# Patient Record
Sex: MALE | Race: WHITE | Employment: OTHER | URBAN - METROPOLITAN AREA
[De-identification: names, ages, dates, MRNs, and addresses within clinical notes are randomized per-mention and may not be internally consistent; named-entity substitution may affect disease eponyms.]

---

## 2018-05-04 PROBLEM — N52.9 ERECTILE DYSFUNCTION: Status: ACTIVE | Noted: 2018-05-04

## 2019-04-06 DIAGNOSIS — N52.9 ERECTILE DYSFUNCTION, UNSPECIFIED ERECTILE DYSFUNCTION TYPE: ICD-10-CM

## 2019-04-09 RX ORDER — SILDENAFIL CITRATE 100 MG
TABLET ORAL
Qty: 18 TABLET | Refills: 2 | Status: SHIPPED | OUTPATIENT
Start: 2019-04-09 | End: 2022-04-21

## 2020-05-11 ENCOUNTER — OFFICE VISIT (OUTPATIENT)
Dept: PRIMARY CARE CLINIC | Age: 50
End: 2020-05-11
Payer: OTHER GOVERNMENT

## 2020-05-11 VITALS
WEIGHT: 186 LBS | SYSTOLIC BLOOD PRESSURE: 122 MMHG | BODY MASS INDEX: 26.88 KG/M2 | OXYGEN SATURATION: 96 % | DIASTOLIC BLOOD PRESSURE: 86 MMHG | TEMPERATURE: 97.7 F | HEART RATE: 103 BPM

## 2020-05-11 PROCEDURE — 99214 OFFICE O/P EST MOD 30 MIN: CPT | Performed by: NURSE PRACTITIONER

## 2020-05-11 RX ORDER — GABAPENTIN 100 MG/1
100 CAPSULE ORAL 3 TIMES DAILY PRN
Qty: 21 CAPSULE | Refills: 0 | Status: SHIPPED | OUTPATIENT
Start: 2020-05-11 | End: 2020-05-21 | Stop reason: SDUPTHER

## 2020-05-11 RX ORDER — TRAMADOL HYDROCHLORIDE 50 MG/1
50 TABLET ORAL EVERY 6 HOURS PRN
COMMUNITY
Start: 2020-05-10 | End: 2021-10-07

## 2020-05-11 ASSESSMENT — ENCOUNTER SYMPTOMS
SHORTNESS OF BREATH: 0
BACK PAIN: 0
WHEEZING: 0
COUGH: 0

## 2020-05-11 ASSESSMENT — PATIENT HEALTH QUESTIONNAIRE - PHQ9
SUM OF ALL RESPONSES TO PHQ9 QUESTIONS 1 & 2: 0
2. FEELING DOWN, DEPRESSED OR HOPELESS: 0
1. LITTLE INTEREST OR PLEASURE IN DOING THINGS: 0
SUM OF ALL RESPONSES TO PHQ QUESTIONS 1-9: 0
SUM OF ALL RESPONSES TO PHQ QUESTIONS 1-9: 0

## 2020-05-11 NOTE — PATIENT INSTRUCTIONS
help?  Call 911 anytime you think you may need emergency care. For example, call if:    · You have sudden chest pain and shortness of breath, or you cough up blood.     · Your leg is cool or pale or changes color.    Call your doctor now or seek immediate medical care if:    · You have increasing or severe pain.     · Your leg suddenly feels weak and you cannot move it.     · You have signs of a blood clot, such as:  ? Pain in your calf, back of the knee, thigh, or groin. ? Redness and swelling in your leg or groin.     · You have signs of infection, such as:  ? Increased pain, swelling, warmth, or redness. ? Red streaks leading from the sore area. ? Pus draining from a place on your leg. ? A fever.     · You cannot bear weight on your leg.    Watch closely for changes in your health, and be sure to contact your doctor if:    · You do not get better as expected. Where can you learn more? Go to https://infoBizz.[email or web address] org and sign in to your [employer] account. Enter T639 in the Project Repat box to learn more about \"Leg Pain: Care Instructions. \"     If you do not have an account, please click on the \"Sign Up Now\" link. Current as of: June 26, 2019Content Version: 12.4  © 5361-3845 Healthwise, Incorporated. Care instructions adapted under license by [facility] ([facility]). If you have questions about a medical condition or this instruction, always ask your healthcare professional. [place] [address] any warranty or liability for your use of this information.

## 2020-05-11 NOTE — PROGRESS NOTES
122/86   05/04/18 122/86          (goal 120/80)    Past Medical History:   Diagnosis Date    Erectile dysfunction 5/4/2018      Past Surgical History:   Procedure Laterality Date    WISDOM TOOTH EXTRACTION Bilateral        Family History   Problem Relation Age of Onset    Hypertension Father     High Cholesterol Father     Liver Cancer Maternal Grandmother     Cancer Paternal Grandmother        Social History     Tobacco Use    Smoking status: Never Smoker    Smokeless tobacco: Never Used   Substance Use Topics    Alcohol use: Yes     Comment: social      Current Outpatient Medications   Medication Sig Dispense Refill    traMADol (ULTRAM) 50 MG tablet Take 50 mg by mouth every 6 hours as needed.  gabapentin (NEURONTIN) 100 MG capsule Take 1 capsule by mouth 3 times daily as needed (nerve pain) for up to 7 days. 21 capsule 0    VIAGRA 100 MG tablet TAKE 1 TABLET AS NEEDED FOR ERECTILE DYSFUNCTION 18 tablet 2     No current facility-administered medications for this visit. No Known Allergies    Health Maintenance   Topic Date Due    HIV screen  11/25/1984    DTaP/Tdap/Td vaccine (1 - Tdap) 11/25/1988    Lipid screen  11/25/2009    Shingles Vaccine (1 of 2) 11/25/2019    Colon cancer screen colonoscopy  11/25/2019    Flu vaccine (Season Ended) 09/01/2020    Hepatitis A vaccine  Aged Out    Hepatitis B vaccine  Aged Out    Hib vaccine  Aged Out    Meningococcal (ACWY) vaccine  Aged Out    Pneumococcal 0-64 years Vaccine  Aged Out       Subjective:      Review of Systems   Constitutional: Negative for chills and fever. Respiratory: Negative for cough, shortness of breath and wheezing. Cardiovascular: Negative for chest pain and palpitations. Musculoskeletal: Positive for gait problem (d/t pain in right leg). Negative for back pain, myalgias, neck pain and neck stiffness.         Lateral right knee pain into lateral portion of RLE   Neurological: Negative for dizziness, light-headedness and headaches. Objective:     /86   Pulse 103   Temp 97.7 °F (36.5 °C)   Wt 186 lb (84.4 kg)   SpO2 96%   BMI 26.88 kg/m²   Physical Exam  Vitals signs and nursing note reviewed. Constitutional:       Appearance: Normal appearance. HENT:      Head: Normocephalic and atraumatic. Neck:      Musculoskeletal: Normal range of motion and neck supple. Cardiovascular:      Rate and Rhythm: Normal rate and regular rhythm. Pulses: Normal pulses. Heart sounds: Normal heart sounds. Pulmonary:      Effort: Pulmonary effort is normal.      Breath sounds: Normal breath sounds. Musculoskeletal:      Right hip: Normal.      Right knee: He exhibits normal range of motion, no swelling, no effusion, no ecchymosis, no deformity, no laceration, no erythema, normal alignment and no bony tenderness. Tenderness found. Lateral joint line tenderness noted. Right ankle: Normal.      Lumbar back: Normal.      Right foot: Normal.   Skin:     General: Skin is warm and dry. Capillary Refill: Capillary refill takes less than 2 seconds. Neurological:      General: No focal deficit present. Mental Status: He is alert and oriented to person, place, and time. Mental status is at baseline. Psychiatric:         Mood and Affect: Mood normal.         Behavior: Behavior normal.         Thought Content: Thought content normal.         Judgment: Judgment normal.       Assessment:       Diagnosis Orders   1. Neuralgia of right perineal nerve  MRI KNEE RIGHT WO CONTRAST    gabapentin (NEURONTIN) 100 MG capsule   2. Pain in lateral portion of right knee  MRI KNEE RIGHT WO CONTRAST   3. Right leg pain  MRI KNEE RIGHT WO CONTRAST    gabapentin (NEURONTIN) 100 MG capsule   4. Numbness of toes  MRI KNEE RIGHT WO CONTRAST        Plan:    1. Replace tramadol with gabapentin. 2. Stay off of right leg as much as possible. Elevate, rest, ice, and compress if it doesn't cause worsening of pain.

## 2020-05-21 RX ORDER — GABAPENTIN 100 MG/1
100 CAPSULE ORAL 3 TIMES DAILY PRN
Qty: 21 CAPSULE | Refills: 0 | Status: SHIPPED | OUTPATIENT
Start: 2020-05-21 | End: 2020-05-22 | Stop reason: SDUPTHER

## 2020-05-22 RX ORDER — GABAPENTIN 100 MG/1
100 CAPSULE ORAL 3 TIMES DAILY PRN
Qty: 21 CAPSULE | Refills: 0 | Status: SHIPPED | OUTPATIENT
Start: 2020-05-22 | End: 2021-10-07

## 2020-06-08 ENCOUNTER — OFFICE VISIT (OUTPATIENT)
Dept: ORTHOPEDIC SURGERY | Age: 50
End: 2020-06-08
Payer: OTHER GOVERNMENT

## 2020-06-08 PROCEDURE — 99203 OFFICE O/P NEW LOW 30 MIN: CPT | Performed by: PHYSICIAN ASSISTANT

## 2020-06-08 RX ORDER — GABAPENTIN 100 MG/1
100 CAPSULE ORAL 3 TIMES DAILY
Qty: 60 CAPSULE | Refills: 0 | Status: SHIPPED | OUTPATIENT
Start: 2020-06-08 | End: 2021-10-07

## 2020-06-08 RX ORDER — DICLOFENAC SODIUM 75 MG/1
75 TABLET, DELAYED RELEASE ORAL 2 TIMES DAILY WITH MEALS
Qty: 28 TABLET | Refills: 0 | Status: SHIPPED | OUTPATIENT
Start: 2020-06-08 | End: 2021-10-07

## 2020-06-08 NOTE — PROGRESS NOTES
Orthopedic Knee Encounter Note     Chief complaint: Right knee/leg pain    HPI: Corie Angel is a 47 y.o. male who presents for for evaluation of right knee and leg pain. Patient currently complaining of lateral right knee pain as well as posterior lateral right lower leg pain. Pain is aggravated by walking, extended weightbearing and relieved by rest.      Patient states pain started approximately 1 month ago which he thought it was his typical sciatica however usually his sciatica does not go into the knee or distally. He states he was seen by his PCP and treated for sciatica and is typical sciatic symptoms resolved i.e. low back pain and posterior thigh pain resolved. However he continued to have this right knee and lower leg pain. His PCP ordered an MRI for further evaluation of the right knee. This MRI showed a very subtle medial meniscus tear that was nondisplaced as well as a low-grade strain to the soleus muscle. No evidence of lateral meniscus tear and cruciate and collateral ligaments were intact. Patient was referred for further evaluation of the medial meniscus tear. He denies any known injury or trauma to this knee. He states he does have intermittent swelling however he denies any redness, warmth, fever or chills. He was started on gabapentin as well as ibuprofen. He states he does believe that the gabapentin provides moderate relief of his pain however he just finished the prescription last week. Previous treatment:    NSAIDs: Ibuprofen    Injections:  None    Physical therapy: None    Surgeries: None    Review of Systems:     Constitution: no fever or chills   Pain level: 5/10  Musculoskeletal: As noted in the HPI   Neurologic: no neurologic symptoms    Past Medical History  Aniya Riggins  has a past medical history of Erectile dysfunction. Past Surgical History  Aniya Riggins  has a past surgical history that includes [place] tooth extraction (Bilateral).     Current Medications  Current Outpatient

## 2021-10-07 ENCOUNTER — OFFICE VISIT (OUTPATIENT)
Dept: PRIMARY CARE CLINIC | Age: 51
End: 2021-10-07
Payer: OTHER GOVERNMENT

## 2021-10-07 VITALS
OXYGEN SATURATION: 98 % | SYSTOLIC BLOOD PRESSURE: 122 MMHG | WEIGHT: 197.8 LBS | HEART RATE: 79 BPM | DIASTOLIC BLOOD PRESSURE: 74 MMHG | BODY MASS INDEX: 28.59 KG/M2

## 2021-10-07 DIAGNOSIS — S53.401A ELBOW SPRAIN, RIGHT, INITIAL ENCOUNTER: Primary | ICD-10-CM

## 2021-10-07 DIAGNOSIS — Z12.11 ENCOUNTER FOR SCREENING COLONOSCOPY: ICD-10-CM

## 2021-10-07 DIAGNOSIS — L02.212 ABSCESS OF LOWER BACK: ICD-10-CM

## 2021-10-07 PROCEDURE — 99213 OFFICE O/P EST LOW 20 MIN: CPT | Performed by: NURSE PRACTITIONER

## 2021-10-07 RX ORDER — PREDNISONE 20 MG/1
TABLET ORAL
Qty: 18 TABLET | Refills: 0 | Status: SHIPPED | OUTPATIENT
Start: 2021-10-07 | End: 2022-04-21 | Stop reason: SDUPTHER

## 2021-10-07 RX ORDER — SULFAMETHOXAZOLE AND TRIMETHOPRIM 800; 160 MG/1; MG/1
1 TABLET ORAL 2 TIMES DAILY
Qty: 20 TABLET | Refills: 0 | Status: SHIPPED | OUTPATIENT
Start: 2021-10-07 | End: 2021-10-17

## 2021-10-07 SDOH — ECONOMIC STABILITY: FOOD INSECURITY: WITHIN THE PAST 12 MONTHS, THE FOOD YOU BOUGHT JUST DIDN'T LAST AND YOU DIDN'T HAVE MONEY TO GET MORE.: NEVER TRUE

## 2021-10-07 SDOH — ECONOMIC STABILITY: FOOD INSECURITY: WITHIN THE PAST 12 MONTHS, YOU WORRIED THAT YOUR FOOD WOULD RUN OUT BEFORE YOU GOT MONEY TO BUY MORE.: NEVER TRUE

## 2021-10-07 ASSESSMENT — PATIENT HEALTH QUESTIONNAIRE - PHQ9
SUM OF ALL RESPONSES TO PHQ QUESTIONS 1-9: 0
SUM OF ALL RESPONSES TO PHQ9 QUESTIONS 1 & 2: 0
1. LITTLE INTEREST OR PLEASURE IN DOING THINGS: 0
2. FEELING DOWN, DEPRESSED OR HOPELESS: 0

## 2021-10-07 ASSESSMENT — ENCOUNTER SYMPTOMS
SORE THROAT: 0
EYE REDNESS: 0
NAUSEA: 0
COLOR CHANGE: 1
WHEEZING: 0
DIARRHEA: 0
ABDOMINAL PAIN: 0
SHORTNESS OF BREATH: 0
VOMITING: 0
COUGH: 0
RHINORRHEA: 0
BACK PAIN: 0
EYE DISCHARGE: 0

## 2021-10-07 ASSESSMENT — SOCIAL DETERMINANTS OF HEALTH (SDOH): HOW HARD IS IT FOR YOU TO PAY FOR THE VERY BASICS LIKE FOOD, HOUSING, MEDICAL CARE, AND HEATING?: NOT HARD AT ALL

## 2021-10-07 NOTE — PROGRESS NOTES
[address] [facility]  [address]  [facility] [address]  Dept: [address] [place] [address] Jason Grayson is a 45 y.o. male Established patient, who presents today for his medical conditions/complaints as noted below. Chief Complaint   Patient presents with    Back Pain     cyst mid-left back - started one month ago -     Other     pain in right elbow - started one month ago -        HPI:     HPI  Cyst on back - felt like a bump no it is red and has some pus out it. Drained some pus and blood. It was tender but it has been worse before it drained. Pain in right elbow. Painful to lift glass. Sometimes painful when sleeping. It is constant with times more severe. Lifging agrevatees and position. No numbness or tingling  He takes ibuprofen sometimes which helps a little. No repattivve motion  It feels like when he hurt his arm pitching as a kid  No recent trauma or injury.     Reviewed prior notes Orthopedics  Reviewed previous   and Hospital Records    No results found for: LDLCHOLESTEROL, LDLCALC    (goal LDL is <100)   No results found for: AST, ALT, BUN, LABA1C, TSH  BP Readings from Last 3 Encounters:   10/07/21 122/74   05/11/20 122/86   05/04/18 122/86          (goal 120/80)    Past Medical History:   Diagnosis Date    Erectile dysfunction 5/4/2018      Past Surgical History:   Procedure Laterality Date    WISDOM TOOTH EXTRACTION Bilateral        Family History   Problem Relation Age of Onset    Hypertension Father     High Cholesterol Father     Liver Cancer Maternal Grandmother     Cancer Paternal Grandmother        Social History     Tobacco Use    Smoking status: Never Smoker    Smokeless tobacco: Never Used   Substance Use Topics    Alcohol use: Yes     Comment: social      Current Outpatient Medications   Medication Sig Dispense Refill    predniSONE (DELTASONE) 20 MG tablet 3 tabs x 3 days, then 2 tabs x 3 days, then 1 tab x 3 days 18 tablet 0    sulfamethoxazole-trimethoprim (BACTRIM DS) 800-160 MG per tablet Take 1 tablet by mouth 2 times daily for 10 days 20 tablet 0    VIAGRA 100 MG tablet TAKE 1 TABLET AS NEEDED FOR ERECTILE DYSFUNCTION 18 tablet 2     No current facility-administered medications for this visit. No Known Allergies    Health Maintenance   Topic Date Due    Hepatitis C screen  Never done    HIV screen  Never done    DTaP/Tdap/Td vaccine (1 - Tdap) Never done    Lipid screen  Never done    Colon cancer screen colonoscopy  Never done    Shingles Vaccine (1 of 2) Never done    Flu vaccine (1) Never done    COVID-19 Vaccine  Completed    Hepatitis A vaccine  Aged Out    Hepatitis B vaccine  Aged Out    Hib vaccine  Aged Out    Meningococcal (ACWY) vaccine  Aged Out    Pneumococcal 0-64 years Vaccine  Aged Out       Subjective:      Review of Systems   Constitutional: Negative for chills and fever. HENT: Negative for rhinorrhea and sore throat. Eyes: Negative for discharge and redness. Respiratory: Negative for cough, shortness of breath and wheezing. Cardiovascular: Negative for chest pain and palpitations. Gastrointestinal: Negative for abdominal pain, diarrhea, nausea and vomiting. Genitourinary: Negative for dysuria and frequency. Musculoskeletal: Negative for arthralgias, back pain and myalgias. Right elbow pain   Skin: Positive for color change. Negative for rash and wound. Neurological: Negative for dizziness, light-headedness and headaches. Psychiatric/Behavioral: Negative for dysphoric mood and sleep disturbance. The patient is not nervous/anxious. Objective:     /74   Pulse 79   Wt 197 lb 12.8 oz (89.7 kg)   SpO2 98%   BMI 28.59 kg/m²   Physical Exam  Vitals and nursing note reviewed. Constitutional:       Appearance: He is obese. HENT:      Head: Normocephalic and atraumatic.       Right Ear: Tympanic membrane, ear canal and external ear normal.      Left Ear: Tympanic membrane, ear canal and external ear normal.   Eyes:      Conjunctiva/sclera: Conjunctivae normal.      Pupils: Pupils are equal, round, and reactive to light. Cardiovascular:      Rate and Rhythm: Regular rhythm. Pulmonary:      Effort: Pulmonary effort is normal.      Breath sounds: Normal breath sounds. Abdominal:      General: Bowel sounds are normal.      Palpations: Abdomen is soft. Musculoskeletal:      Right upper arm: Tenderness present. No swelling, edema or bony tenderness. Left upper arm: Normal.        Arms:       Cervical back: Normal range of motion and neck supple. Comments: Right ligament tenderness on palpation. Pain with right elbow movement   Skin:     General: Skin is warm. Comments: 3 cm abscess lower back with erythema and tenderness and no drainage at this time   Neurological:      Mental Status: He is alert and oriented to person, place, and time. Cranial Nerves: No cranial nerve deficit. Psychiatric:         Mood and Affect: Mood normal.         Thought Content: Thought content normal.         Judgment: Judgment normal.         Assessment/Plan:   1. Elbow sprain, right, initial encounter  -     predniSONE (DELTASONE) 20 MG tablet; 3 tabs x 3 days, then 2 tabs x 3 days, then 1 tab x 3 days, Disp-18 tablet, R-0Normal  2. Abscess of lower back  -     sulfamethoxazole-trimethoprim (BACTRIM DS) 800-160 MG per tablet; Take 1 tablet by mouth 2 times daily for 10 days, Disp-20 tablet, R-0Normal     Warm moist compress to back abscess 3x/day. Apply a compressive ACE bandage. Rest and elevate the affected painful area. Apply cold compresses intermittently as needed. As pain recedes, begin normal activities slowly as tolerated. Call if symptoms persist.    Antibiotic - Bactrim DS x 10 days  Prednisone taper dose for 9 days. Return if symptoms worsen or fail to improve. No orders of the defined types were placed in this encounter.     Orders Placed This

## 2021-10-08 ENCOUNTER — TELEPHONE (OUTPATIENT)
Dept: GASTROENTEROLOGY | Age: 51
End: 2021-10-08

## 2021-10-08 NOTE — TELEPHONE ENCOUNTER
LVMx1 to schedule off of Referral. Pt has never been seen by a GI doctor in our practice. Office will need to go over screening questions prior to scheduling. Mailing letter as 2nd attempt.

## 2021-10-13 RX ORDER — BISACODYL 5 MG
TABLET, DELAYED RELEASE (ENTERIC COATED) ORAL
Qty: 4 TABLET | Refills: 0 | Status: SHIPPED | OUTPATIENT
Start: 2021-10-13 | End: 2022-04-21

## 2021-10-13 RX ORDER — POLYETHYLENE GLYCOL 3350 17 G/17G
POWDER, FOR SOLUTION ORAL
Qty: 238 G | Refills: 0 | Status: SHIPPED | OUTPATIENT
Start: 2021-10-13 | End: 2022-04-21

## 2021-10-14 NOTE — TELEPHONE ENCOUNTER
Patient LVM asking to cancel his procedure. Procedure was cancelled, writer attempted to contact patient back but phone rings busy.

## 2022-04-21 ENCOUNTER — OFFICE VISIT (OUTPATIENT)
Dept: PRIMARY CARE CLINIC | Age: 52
End: 2022-04-21
Payer: OTHER GOVERNMENT

## 2022-04-21 VITALS
HEART RATE: 80 BPM | WEIGHT: 204.2 LBS | BODY MASS INDEX: 29.51 KG/M2 | OXYGEN SATURATION: 98 % | DIASTOLIC BLOOD PRESSURE: 84 MMHG | SYSTOLIC BLOOD PRESSURE: 130 MMHG

## 2022-04-21 DIAGNOSIS — M25.551 CHRONIC RIGHT HIP PAIN: Primary | ICD-10-CM

## 2022-04-21 DIAGNOSIS — G89.29 CHRONIC RIGHT HIP PAIN: Primary | ICD-10-CM

## 2022-04-21 DIAGNOSIS — R20.0 RIGHT LEG NUMBNESS: ICD-10-CM

## 2022-04-21 PROCEDURE — 99214 OFFICE O/P EST MOD 30 MIN: CPT | Performed by: NURSE PRACTITIONER

## 2022-04-21 RX ORDER — GABAPENTIN 100 MG/1
100 CAPSULE ORAL 3 TIMES DAILY
Qty: 60 CAPSULE | Refills: 0 | Status: SHIPPED | OUTPATIENT
Start: 2022-04-21 | End: 2022-07-28

## 2022-04-21 RX ORDER — PREDNISONE 20 MG/1
TABLET ORAL
Qty: 18 TABLET | Refills: 0 | Status: SHIPPED | OUTPATIENT
Start: 2022-04-21 | End: 2022-05-01

## 2022-04-21 ASSESSMENT — ENCOUNTER SYMPTOMS: BACK PAIN: 0

## 2022-04-21 NOTE — PROGRESS NOTES
[address] [facility]  [address] [facility] [address]  Dept: 8[address] Jake Grayson is a 45 y.o. male Established patient, who presents today for his medical conditions/complaints as noted below. Chief Complaint   Patient presents with    Hip Pain     ongoing X 1 year  pt did see PT - does excersises they recommended - takes IBU OTC little relief. pt rates pain 1/10 currently & unable to walk when it is at its worst.       HPI:     Hip Pain   Incident onset: no incident - it started hurting about a year ago. There was no injury mechanism. The pain is present in the right hip. The pain is at a severity of 1/10. Pain severity now: can be severe at times. The pain has been fluctuating since onset. Associated symptoms include numbness (4th and 5th toe on right foot have been numb for a year since knee pain, right lateral knee also numb) and tingling. Pertinent negatives include no inability to bear weight, loss of motion, loss of sensation or muscle weakness. Exacerbated by: walking. He has tried NSAIDs and rest for the symptoms. The treatment provided mild relief.        Reviewed prior notes    Reviewed previous      No results found for: LDLCHOLESTEROL, LDLCALC    (goal LDL is <100)   No results found for: AST, ALT, BUN, LABA1C, TSH  BP Readings from Last 3 Encounters:   04/21/22 130/84   10/07/21 122/74   05/11/20 122/86          (goal 120/80)    Past Medical History:   Diagnosis Date    Erectile dysfunction 5/4/2018      Past Surgical History:   Procedure Laterality Date    WISDOM TOOTH EXTRACTION Bilateral        Family History   Problem Relation Age of Onset    Hypertension Father     High Cholesterol Father     Liver Cancer Maternal Grandmother     Cancer Paternal Grandmother        Social History     Tobacco Use    Smoking status: Never Smoker    Smokeless tobacco: Never Used   Substance Use Topics    Alcohol use: Yes     Comment: social Current Outpatient Medications   Medication Sig Dispense Refill    predniSONE (DELTASONE) 20 MG tablet 3 tabs x 3 days, then 2 tabs x 3 days, then 1 tab x 3 days 18 tablet 0    gabapentin (NEURONTIN) 100 MG capsule Take 1 capsule by mouth 3 times daily for 20 days. Intended supply: 30 days 60 capsule 0     No current facility-administered medications for this visit. No Known Allergies    Health Maintenance   Topic Date Due    HIV screen  Never done    Hepatitis C screen  Never done    DTaP/Tdap/Td vaccine (1 - Tdap) Never done    Lipids  Never done    Colorectal Cancer Screen  Never done    Shingles Vaccine (1 of 2) Never done    Flu vaccine (Season Ended) 09/01/2022    Depression Screen  10/07/2022    COVID-19 Vaccine  Completed    Hepatitis A vaccine  Aged Out    Hepatitis B vaccine  Aged Out    Hib vaccine  Aged Out    Meningococcal (ACWY) vaccine  Aged Out    Pneumococcal 0-64 years Vaccine  Aged Out       Subjective:      Review of Systems   Musculoskeletal: Positive for gait problem (when hip hurts). Negative for back pain. Right hip pain   Neurological: Positive for tingling and numbness (4th and 5th toe on right foot have been numb for a year since knee pain, right lateral knee also numb). Objective:     /84   Pulse 80   Wt 204 lb 3.2 oz (92.6 kg)   SpO2 98%   BMI 29.51 kg/m²   Physical Exam  Vitals and nursing note reviewed. Constitutional:       Appearance: Normal appearance. HENT:      Head: Normocephalic and atraumatic. Cardiovascular:      Rate and Rhythm: Normal rate and regular rhythm. Heart sounds: Normal heart sounds. Pulmonary:      Effort: Pulmonary effort is normal.      Breath sounds: Normal breath sounds. Abdominal:      General: Bowel sounds are normal.      Palpations: Abdomen is soft. Musculoskeletal:      Lumbar back: No tenderness or bony tenderness. Normal range of motion. Right hip: Bony tenderness present.  Normal strength. Left hip: No bony tenderness. Normal range of motion. Normal strength. Legs:       Comments: Right hip tenderness on anterior palpation at greater tuberosity. Pain with abduction of hips. Skin:     General: Skin is warm. Neurological:      Mental Status: He is alert. Psychiatric:         Mood and Affect: Mood normal.         Behavior: Behavior normal.         Thought Content: Thought content normal.         Assessment/Plan:   1. Chronic right hip pain  -     XR HIP RIGHT (1 VIEW); Future  -     XR LUMBAR SPINE (2-3 VIEWS); Future  -     predniSONE (DELTASONE) 20 MG tablet; 3 tabs x 3 days, then 2 tabs x 3 days, then 1 tab x 3 days, Disp-18 tablet, R-0Normal  -     gabapentin (NEURONTIN) 100 MG capsule; Take 1 capsule by mouth 3 times daily for 20 days. Intended supply: 30 days, Disp-60 capsule, R-0Normal  2. Right leg numbness  -     XR HIP RIGHT (1 VIEW); Future  -     XR LUMBAR SPINE (2-3 VIEWS); Future  -     predniSONE (DELTASONE) 20 MG tablet; 3 tabs x 3 days, then 2 tabs x 3 days, then 1 tab x 3 days, Disp-18 tablet, R-0Normal  -     gabapentin (NEURONTIN) 100 MG capsule; Take 1 capsule by mouth 3 times daily for 20 days. Intended supply: 30 days, Disp-60 capsule, R-0Normal     Prednisone taper dose  Gabapentin 100 three times per day  X-ray of right hip and spine  Hip stretching exercises    Return in about 6 months (around 10/21/2022) for physical or if pain does not improve with current treatment make follow up. .    Orders Placed This Encounter   Procedures    XR HIP RIGHT (1 VIEW)     Standing Status:   Future     Standing Expiration Date:   4/21/2023     Order Specific Question:   Reason for exam:     Answer:   pain and numbness    XR LUMBAR SPINE (2-3 VIEWS)     Standing Status:   Future     Standing Expiration Date:   4/21/2023     Order Specific Question:   Reason for exam:     Answer:   pain and numbness     Orders Placed This Encounter   Medications    predniSONE (DELTASONE) 20 MG tablet     Sig: 3 tabs x 3 days, then 2 tabs x 3 days, then 1 tab x 3 days     Dispense:  18 tablet     Refill:  0    gabapentin (NEURONTIN) 100 MG capsule     Sig: Take 1 capsule by mouth 3 times daily for 20 days. Intended supply: 30 days     Dispense:  60 capsule     Refill:  0       Patient given educational materials - see patient instructions. Discussed use, benefit, and side effects of prescribed medications. All patient questions answered. Pt voiced understanding. Reviewed health maintenance. Instructed to continue current medications, diet and exercise. Patient agreed with treatment plan. Follow up as directed.      Electronically signed by ROXY Magallanes CNP on 4/21/2022 at 3:54 PM

## 2022-04-22 ENCOUNTER — HOSPITAL ENCOUNTER (OUTPATIENT)
Dept: GENERAL RADIOLOGY | Facility: CLINIC | Age: 52
Discharge: HOME | End: 2022-04-24
Payer: OTHER GOVERNMENT

## 2022-04-22 ENCOUNTER — HOSPITAL ENCOUNTER (OUTPATIENT)
Facility: CLINIC | Age: 52
Discharge: HOME | End: 2022-04-24
Payer: OTHER GOVERNMENT

## 2022-04-22 DIAGNOSIS — M25.551 CHRONIC RIGHT HIP PAIN: ICD-10-CM

## 2022-04-22 DIAGNOSIS — G89.29 CHRONIC RIGHT HIP PAIN: ICD-10-CM

## 2022-04-22 DIAGNOSIS — R20.0 RIGHT LEG NUMBNESS: ICD-10-CM

## 2022-04-22 PROCEDURE — 72100 X-RAY EXAM L-S SPINE 2/3 VWS: CPT

## 2022-04-22 PROCEDURE — 73501 X-RAY EXAM HIP UNI 1 VIEW: CPT

## 2022-04-26 DIAGNOSIS — M25.551 CHRONIC RIGHT HIP PAIN: Primary | ICD-10-CM

## 2022-04-26 DIAGNOSIS — G89.29 CHRONIC RIGHT HIP PAIN: Primary | ICD-10-CM

## 2022-04-26 NOTE — PROGRESS NOTES
I recommend he see orthopedics for right hip pain. I entered referral to Mildred Gandara who he has seen before.

## 2022-05-12 ENCOUNTER — OFFICE VISIT (OUTPATIENT)
Dept: ORTHOPEDIC SURGERY | Age: 52
End: 2022-05-12
Payer: OTHER GOVERNMENT

## 2022-05-12 VITALS — BODY MASS INDEX: 29.2 KG/M2 | HEIGHT: 70 IN | WEIGHT: 204 LBS

## 2022-05-12 DIAGNOSIS — M54.16 LUMBAR RADICULOPATHY: ICD-10-CM

## 2022-05-12 DIAGNOSIS — M51.36 DDD (DEGENERATIVE DISC DISEASE), LUMBAR: Primary | ICD-10-CM

## 2022-05-12 PROCEDURE — 99214 OFFICE O/P EST MOD 30 MIN: CPT | Performed by: PHYSICIAN ASSISTANT

## 2022-05-12 NOTE — PROGRESS NOTES
[address] [facility], [address] [facility] [place], [address] [place] [address], [address] [facility]           Dept Phone: [phone]           Dept Fax:  [phone] [address] [facility]           Jesica Canela          Dept Phone: [phone]           Dept Fax:  [phone]      Chief Compliant:  Chief Complaint   Patient presents with    Hip Pain     Right        History of Present Illness: This is a 45 y.o. male who presents to the clinic today for evaluation of right leg pain and numbness and tingling. Patient reports he has been dealing with numbness from the knee to the foot for approximately 2 years. Of note patient was seen for right knee pain on 6/8/2020 that demonstrated a subtle medial meniscus tear however he had complete resolution of pain with physical therapy and the knee but pretty much since then has continued to have numbness and tingling. Patient reports numbness and tingling is most severe over the lateral leg starting at the knee into the fourth and fifth toes. Patient reports reports intermittent history of lateral right hip pain that has been present for the past several months does note occasional radiation of pain into the right low back as well. Patient was evaluated by primary care provider who ordered x-rays of the right hip and low back which demonstrated fairly significant osteoarthritis of the low back with the space narrowing was referred to orthopedics for further evaluation which brings him in today. Patient denies any bowel or bladder dysfunction, fever, chills, nausea or vomiting. No history of physical therapy for the back, lumbar epidural steroid injections or surgery. Past History:    Current Outpatient Medications:     gabapentin (NEURONTIN) 100 MG capsule, Take 1 capsule by mouth 3 times daily for 20 days.  Intended supply: 30 days, Disp: 60 capsule, Rfl: 0  No Known Allergies  Social History     Socioeconomic History    Marital status: Unknown     Spouse name: Not on file    Number of children: Not on file    Years of education: Not on file    Highest education level: Not on file   Occupational History    Not on file   Tobacco Use    Smoking status: Never Smoker    Smokeless tobacco: Never Used   Vaping Use    Vaping Use: Never used   Substance and Sexual Activity    Alcohol use: Yes     Comment: social    Drug use: No    Sexual activity: Yes     Partners: Female   Other Topics Concern    Not on file   Social History Narrative    Not on file     Social Determinants of Health     Financial Resource Strain: Low Risk     Difficulty of Paying Living Expenses: Not hard at all   Food Insecurity: No Food Insecurity    Worried About Running Out of Food in the Last Year: Never true    [address] in the Last Year: Never true   Transportation Needs:     Lack of Transportation (Medical): Not on file    Lack of Transportation (Non-Medical):  Not on file   Physical Activity:     Days of Exercise per Week: Not on file    Minutes of Exercise per Session: Not on file   Stress:     Feeling of Stress : Not on file   Social Connections:     Frequency of Communication with Friends and Family: Not on file    Frequency of Social Gatherings with Friends and Family: Not on file    Attends Islam Services: Not on file    Active Member of [address] [place] [address] or Organizations: Not on file    Attends Club or Organization Meetings: Not on file    Marital Status: Not on file   Intimate Partner Violence:     Fear of Current or Ex-Partner: Not on file    Emotionally Abused: Not on file    Physically Abused: Not on file    Sexually Abused: Not on file   Housing Stability:     Unable to Pay for Housing in the Last Year: Not on file    Number of [place] in the Last Year: Not on file    Unstable Housing in the Last Year: Not on file     Past Medical History:   Diagnosis Date    Erectile dysfunction 5/4/2018     Past Surgical History:   Procedure Laterality Date    WISDOM TOOTH EXTRACTION Bilateral      Family History   Problem Relation Age of Onset    Hypertension Father     High Cholesterol Father     Liver Cancer Maternal Grandmother     Cancer Paternal Grandmother         Review of Systems   Constitutional: Negative for fever, chills, sweats. Eyes: Negative for changes in vision, or pain. HENT: Negative for ear ache, epistaxis, or sore throat. Respiratory/Cardio: Negative for Chest pain, palpitations, SOB, or cough. Gastrointestinal: Negative for abdominal pain, N/V/D. Genitourinary: Negative for dysuria, frequency, urgency, or hematuria. Neurological: Negative for headache, numbness, or weakness. Integumentary: Negative for rash, itching, laceration, or abrasion. Musculoskeletal: Positive for Hip Pain (Right)       Physical Exam:  Constitutional: Patient is oriented to person, place, and time. Patient appears well-developed and well nourished. HENT: Negative otherwise noted  Head: Normocephalic and Atraumatic  Nose: Normal  Eyes: Conjunctivae and EOM are normal  Neck: Normal range of motion Neck supple. Respiratory/Cardio: Effort normal. No respiratory distress. Musculoskeletal:    LUMBAR/SACRAL EXAMINATION:  · Inspection: Local inspection shows no step-off or bruising. Lumbar alignment is normal.  Sagittal and Coronal balance is neutral.      · Palpation:   Moderate right sided tenderness at the paraspinal.  Minimal tenderness over the right greater trochanter no evidence of tenderness at the midline. There is no step-off or paraspinal spasm. · Range of Motion: Lumbar flexion, extension and rotation are mildly limited due to pain. · Strength:   Strength testing is 5/5 in all muscle groups tested.    · Special Tests:   Positive straight leg raise and cross straight leg raise at about 30 degrees on the right negative on the left. Leg length and pelvis level.  0 out of 5 Ofelia's signs. · Skin: There are no rashes, ulcerations or lesions. · Reflexes: Reflexes are symmetrically 2+ at the patellar and ankle tendons. Clonus absent bilaterally at the feet. · Gait & station: Antalgic, patient ambulates without assistance  · Additional Examinations:   · RIGHT LOWER EXTREMITY:  Patient with decreased 4 out of 5 strength with knee flexion and plantar flexion. He has normal strength with dorsiflexion, knee extension and hip flexion of the right side. · LEFT LOWER EXTREMITY:  Inspection/examination of the left lower extremity does not show any tenderness, deformity or injury. Range of motion is unremarkable. There is no gross instability. There are no rashes, ulcerations or lesions. Strength and tone are normal.    Neurological: Patient is alert and oriented to person, place, and time. Normal strenght. No sensory deficit. Skin: Skin is warm and dry  Psychiatric: Behavior is normal. Thought content normal.  Nursing note and vitals reviewed. Labs and Imaging:     XR taken today:  No results found. No new x-rays are taken today however those from 4/22/2022 are available for independent review  3 views of the lumbar spine demonstrate multilevel lumbar DDD with significant disc base narrowing at L5-S1. No evidence of anterior or retrolisthesis or acute fracture. 1 view of the right hip demonstrates maintenance of femoral acetabular joint space no significant spurring, subluxation or dislocation. No evidence of AVN.          Orders Placed This Encounter   Procedures    MRI LUMBAR SPINE WO CONTRAST     Standing Status:   Future     Standing Expiration Date:   5/12/2023   Francisca Hernandez MD, Pain Management, [place]     Referral Priority:   Routine     Referral Type:   Eval and Treat     Referral Reason:   Specialty Services Required     Referred to Provider:   Paola Adorno MD     Requested Specialty: Pain Management     Number of Visits Requested:   1       Assessment and Plan:  1. DDD (degenerative disc disease), lumbar    2. Lumbar radiculopathy          PLAN:  Ronna Duong is a 45 y.o. old malewho presents today for evaluation of low back pain. Pain and right-sided paresthesias has been present for 2 years. Differential includes lumbar strain, fracture, spondylitic spondylolisthesis, lumbar DDD, lumbar radiculopathy, epidural abscess, epidural hematoma and cauda equina syndrome. Based on examination there is no evidence of cord compression syndrome or infectious etiology. Examination is consistent with lumbar radiculopathy likely due to L5-S1 lumbar DDD and possible disc protrusion and compression of the right S1 nerve root. .    We discussed treatment options available to him, including nonoperative and operative intervention. At this time I believe he will benefit from further diagnostic evaluation with an MRI of the lumbar spine given patient's failure of conservative management extended symptomology. Patient does display interest and lumbar epidural steroid injections continue recommend continuing with plan for MRI however he is provided with a referral to pain management for possible LESI. We will contact patient with MRI results and further follow-up will be arranged at that time. Please note that this chart was generated using voice recognition Dragon dictation software. Although every effort was made to ensure the accuracy of this automated transcription, some errors in transcription may have occurred.

## 2022-05-19 ENCOUNTER — HOSPITAL ENCOUNTER (OUTPATIENT)
Dept: MRI IMAGING | Facility: CLINIC | Age: 52
Discharge: HOME | End: 2022-05-21
Payer: OTHER GOVERNMENT

## 2022-05-19 DIAGNOSIS — M54.16 LUMBAR RADICULOPATHY: ICD-10-CM

## 2022-05-19 DIAGNOSIS — M51.36 DDD (DEGENERATIVE DISC DISEASE), LUMBAR: ICD-10-CM

## 2022-05-19 PROCEDURE — 72148 MRI LUMBAR SPINE W/O DYE: CPT

## 2022-05-23 ENCOUNTER — INITIAL CONSULT (OUTPATIENT)
Dept: PAIN MANAGEMENT | Age: 52
End: 2022-05-23
Payer: OTHER GOVERNMENT

## 2022-05-23 VITALS — BODY MASS INDEX: 28.66 KG/M2 | HEIGHT: 70 IN | WEIGHT: 200.2 LBS

## 2022-05-23 DIAGNOSIS — M54.17 LUMBOSACRAL NEURITIS: Primary | ICD-10-CM

## 2022-05-23 DIAGNOSIS — M47.817 LUMBOSACRAL SPONDYLOSIS WITHOUT MYELOPATHY: ICD-10-CM

## 2022-05-23 PROCEDURE — 99204 OFFICE O/P NEW MOD 45 MIN: CPT | Performed by: PAIN MEDICINE

## 2022-05-23 ASSESSMENT — ENCOUNTER SYMPTOMS: BACK PAIN: 1

## 2022-05-23 NOTE — PROGRESS NOTES
HPI:     Back Pain  This is a chronic problem. The current episode started more than 1 year ago. The problem occurs constantly. The problem has been gradually worsening since onset. The pain is present in the lumbar spine. The pain radiates to the right foot. The pain is moderate. The pain is the same all the time. The symptoms are aggravated by position. Stiffness is present all day. Associated symptoms include leg pain. Pain in the low back down the right leg. MRI lumbar spine with degenerative changes and disc bulging with right L5-S1 disc bulge. Physical therapy. Continues to have some lingering pain    Patient denies any new neurological symptoms. No bowel or bladder incontinence, no weakness, and no falling. Review of OARRS does not show any aberrant prescription behavior. Past Medical History:   Diagnosis Date    Erectile dysfunction 5/4/2018       Past Surgical History:   Procedure Laterality Date    LIPOMA RESECTION  2005    WISDOM TOOTH EXTRACTION Bilateral        No Known Allergies      Current Outpatient Medications:     gabapentin (NEURONTIN) 100 MG capsule, Take 1 capsule by mouth 3 times daily for 20 days.  Intended supply: 30 days, Disp: 60 capsule, Rfl: 0    Family History   Problem Relation Age of Onset    Hypertension Father     High Cholesterol Father     Liver Cancer Maternal Grandmother     Cancer Paternal Grandmother        Social History     Socioeconomic History    Marital status: Unknown     Spouse name: Not on file    Number of children: Not on file    Years of education: Not on file    Highest education level: Not on file   Occupational History    Not on file   Tobacco Use    Smoking status: Never Smoker    Smokeless tobacco: Never Used   Vaping Use    Vaping Use: Never used   Substance and Sexual Activity    Alcohol use: Yes     Comment: social    Drug use: No    Sexual activity: Yes     Partners: Female   Other Topics Concern    Not on file   Social History Narrative    Not on file     Social Determinants of Health     Financial Resource Strain: Low Risk     Difficulty of Paying Living Expenses: Not hard at all   Food Insecurity: No Food Insecurity    Worried About Running Out of Food in the Last Year: Never true    [address] in the Last Year: Never true   Transportation Needs:     Lack of Transportation (Medical): Not on file    Lack of Transportation (Non-Medical): Not on file   Physical Activity:     Days of Exercise per Week: Not on file    Minutes of Exercise per Session: Not on file   Stress:     Feeling of Stress : Not on file   Social Connections:     Frequency of Communication with Friends and Family: Not on file    Frequency of Social Gatherings with Friends and Family: Not on file    Attends Pent[employer]ostalism Services: Not on file    Active Member of [address] [place] [address] [place] or Organizations: Not on file    Attends Club or Organization Meetings: Not on file    Marital Status: Not on file   Intimate Partner Violence:     Fear of Current or Ex-Partner: Not on file    Emotionally Abused: Not on file    Physically Abused: Not on file    Sexually Abused: Not on file   Housing Stability:     Unable to Pay for Housing in the Last Year: Not on file    Number of [place] in the Last Year: Not on file    Unstable Housing in the Last Year: Not on file       Review of Systems:  Review of Systems   Musculoskeletal: Positive for back pain. Physical Exam:  Ht 5' 10\" (1.778 m)   Wt 200 lb 3.2 oz (90.8 kg)   BMI 28.73 kg/m²     Physical Exam  Constitutional:       Appearance: Normal appearance. Pulmonary:      Effort: Pulmonary effort is normal.   Neurological:      Mental Status: He is alert.    Psychiatric:         Attention and Perception: Attention and perception normal.         Mood and Affect: Mood and affect normal.         Record/Diagnostics Review:    As above, I did independently review the imaging     Orders:  Orders Placed This Encounter Procedures    GA NJX AA&/STRD TFRML EPI LUMBAR/SACRAL 1 LEVEL    GA NJX AA&/STRD TFRML EPI LUMBAR/SACRAL EA ADDL       Assessment:  1. Lumbosacral neuritis    2. Lumbosacral spondylosis without myelopathy        Treatment Plan:  DISCUSSION: Treatment options discussed with patient and all questions answered to patient's satisfaction. OARRS Review: Reviewed and acceptable for medications prescribed. TREATMENT OPTIONS:     Discussed different treatment options including continued conservative care such as physical therapy, chiropractic care, acupuncture. Discussed different interventional options such as epidural steroids or medial branch blocks. Also discussed neuromodulation in the form of spinal cord stimulation. Also discussed surgical evaluation. Pain in the low back and legs continues despite conservative measures, may benefit from epidural steroid injections, we'll try the Right L5 and S1 transforaminal approach x 2        Medardo Zimmer M.D. I have reviewed the chief complaint and history of present illness (including ROS and PFSH) and vital documentation by my staff and I agree with their documentation and have added where applicable.

## 2022-05-26 ENCOUNTER — OFFICE VISIT (OUTPATIENT)
Dept: ORTHOPEDIC SURGERY | Age: 52
End: 2022-05-26
Payer: OTHER GOVERNMENT

## 2022-05-26 DIAGNOSIS — M51.36 DDD (DEGENERATIVE DISC DISEASE), LUMBAR: Primary | ICD-10-CM

## 2022-05-26 DIAGNOSIS — M54.16 LUMBAR RADICULOPATHY: ICD-10-CM

## 2022-05-26 DIAGNOSIS — M43.10 ACQUIRED SPONDYLOLISTHESIS: ICD-10-CM

## 2022-05-26 DIAGNOSIS — M48.061 SPINAL STENOSIS, LUMBAR REGION, WITHOUT NEUROGENIC CLAUDICATION: ICD-10-CM

## 2022-05-26 PROCEDURE — 99213 OFFICE O/P EST LOW 20 MIN: CPT | Performed by: ORTHOPAEDIC SURGERY

## 2022-05-26 SDOH — HEALTH STABILITY: PHYSICAL HEALTH: ON AVERAGE, HOW MANY DAYS PER WEEK DO YOU ENGAGE IN MODERATE TO STRENUOUS EXERCISE (LIKE A BRISK WALK)?: 5 DAYS

## 2022-05-26 SDOH — HEALTH STABILITY: PHYSICAL HEALTH: ON AVERAGE, HOW MANY MINUTES DO YOU ENGAGE IN EXERCISE AT THIS LEVEL?: 30 MIN

## 2022-05-26 NOTE — PROGRESS NOTES
Patient ID: Vasu Knox is a 45 y.o. male    Chief Compliant:  Chief Complaint   Patient presents with    Pain     mri lumbar        Diagnostic imaging:  MRI lumbar spine and diagnostic x-rays reviewed patient with advanced L5-S1 disc base collapse reactive endplate changes mild 2 to 3 mm retrolisthesis greater on the right than the left associated the right paracentral disc herniation resulting in right lateral recess stenosis      Assessment and Plan:  1. DDD (degenerative disc disease), lumbar    2. Lumbar radiculopathy    3. Spinal stenosis, lumbar region, without neurogenic claudication    4. Acquired spondylolisthesis      2-year history of S1 radicular leg pain with some numbness to the small toe neurogenic claudication qualities    Patient already scheduled for lumbar epidural steroid injections      PT    Proceed with LESI     At some point consider right L5-S1 posterior decompression and fusion if he fails conservative treatment    Follow up 2 months may consider L5-S1 PLIF    HPI:  This is a 45 y.o. male who presents to the clinic today for lower back evaluation. Patient is here for MRI results. Patient with right radicular leg pain along the lateral right hip to the lateral leg and toes. Symptoms have been ongoing for 2 years. Patient notes numbness over the lateral right calf to his 4th and 5th toes. Pain is aggravated by walking long distances    Patient was referred to pain management for LESI, scheduled for injection in June. He has not underwent PT    Review of Systems   All other systems reviewed and are negative. Past History:    Current Outpatient Medications:     gabapentin (NEURONTIN) 100 MG capsule, Take 1 capsule by mouth 3 times daily for 20 days.  Intended supply: 30 days, Disp: 60 capsule, Rfl: 0  No Known Allergies  Social History     Socioeconomic History    Marital status: Unknown     Spouse name: Not on file    Number of children: Not on file    Years of education: Not on file    Highest education level: Not on file   Occupational History    Not on file   Tobacco Use    Smoking status: Never Smoker    Smokeless tobacco: Never Used   Vaping Use    Vaping Use: Never used   Substance and Sexual Activity    Alcohol use: Yes     Comment: social    Drug use: No    Sexual activity: Yes     Partners: Female   Other Topics Concern    Not on file   Social History Narrative    Not on file     Social Determinants of Health     Financial Resource Strain: Low Risk     Difficulty of Paying Living Expenses: Not hard at all   Food Insecurity: No Food Insecurity    Worried About [address] in the Last Year: Never true    [address] [facility] in the Last Year: Never true   Transportation Needs:     Lack of Transportation (Medical): Not on file    Lack of Transportation (Non-Medical):  Not on file   Physical Activity: Sufficiently Active    Days of Exercise per Week: 5 days    Minutes of Exercise per Session: 30 min   Stress:     Feeling of Stress : Not on file   Social Connections:     Frequency of Communication with Friends and Family: Not on file    Frequency of Social Gatherings with Friends and Family: Not on file    Attends [employer] Services: Not on file    Active Member of Clubs or Organizations: Not on file    Attends Club or Organization Meetings: Not on file    Marital Status: Not on file   Intimate Partner Violence: Not At Risk    Fear of Current or Ex-Partner: No    Emotionally Abused: No    Physically Abused: No    Sexually Abused: No   Housing Stability:     Unable to Pay for Housing in the Last Year: Not on file    Number of [place] in the Last Year: Not on file    Unstable Housing in the Last Year: Not on file     Past Medical History:   Diagnosis Date    Erectile dysfunction 5/4/2018     Past Surgical History:   Procedure Laterality Date    LIPOMA RESECTION  2005    WISDOM TOOTH EXTRACTION Bilateral      Family History Problem Relation Age of Onset    Hypertension Father     High Cholesterol Father     Liver Cancer Maternal Grandmother     Cancer Paternal Grandmother         Physical Exam:  Vitals signs and nursing note reviewed. Constitutional:       Appearance: well-developed. HENT:      Head: Normocephalic and atraumatic. Nose: Nose normal.   Eyes:      Conjunctiva/sclera: Conjunctivae normal.   Neck:      Musculoskeletal: Normal range of motion and neck supple. Pulmonary:      Effort: Pulmonary effort is normal. No respiratory distress. Musculoskeletal:      Comments: Normal gait     Skin:     General: Skin is warm and dry. Neurological:      Mental Status: Alert and oriented to person, place, and time. Sensory: No sensory deficit. Psychiatric:         Behavior: Behavior normal.         Thought Content: Thought content normal.        Provider Attestation:  Felicitas Manning personally performed the services described in this documentation. All medical record entries made by the scribe were at my direction and in my presence. I have reviewed the chart and discharge instructions and agree that the records reflect my personal performance and is accurate and complete. Delilah Caldwell MD 5/26/22       Scribe Attestation:  By signing my name below, Mary High, attest that this documentation has been prepared under the direction and in the presence of Dr. Prerna Gallardo. Electronically signed: Sharath Marte, 5/26/22     Please note that this chart was generated using voice recognition Dragon dictation software. Although every effort was made to ensure the accuracy of this automated transcription, some errors in transcription may have occurred.

## 2022-06-13 ENCOUNTER — HOSPITAL ENCOUNTER (OUTPATIENT)
Dept: PHYSICAL THERAPY | Age: 52
Setting detail: THERAPIES SERIES
Discharge: HOME | End: 2022-06-13
Payer: OTHER GOVERNMENT

## 2022-06-13 PROCEDURE — 97162 PT EVAL MOD COMPLEX 30 MIN: CPT

## 2022-06-13 PROCEDURE — 97110 THERAPEUTIC EXERCISES: CPT

## 2022-06-13 NOTE — CONSULTS
[x] [facility]) - [facility] & Therapy  [address]  [place]: [phone]   F: [phone]     Physical Therapy Lumbar Evaluation    Date:  2022  Patient: Madi Leyva  : 1970  MRN: [id number]  Physician: Guerrero Stearns MD  Insurance: [employer] -- based in [place], $50 copay    Medical Diagnosis:      M51.36 (ICD-10-CM) - DDD (degenerative disc disease), lumbar   M54.16 (ICD-10-CM) - Lumbar radiculopathy   M48.061 (ICD-10-CM) - Spinal stenosis, lumbar region, without neurogenic claudication   M43.10 (ICD-10-CM) - Acquired spondylolisthesis      Rehab Codes: R54.4 chronic low back pain , M25.60 stiffness   Onset Date:    Next 's appt: 22     Precautions: none    Subjective:   Pt notes he is here due to RLE radicular pain that was confirmed with imaging to be related to bulging disc. Pt notes for the past 2 years he has been having R LE numbness and pain that increases with standing and walking. Numbness in R lateral lower leg and 4th & 5th digits. At peak pain will feel it go down the R posterior hip and into the leg. This pain limits his tolerance to taking a shower. Feels he has to sit or lay down to relieve the pain. The numbness never changes. He is taking gabapentin, ibuprofen, and advil to manage pain. He has now see orthopedics and pain management for the pain. He plans to have lumbar epidural injections for at the end of . HPI: from ortho visit 22  This is a 45 y.o. male who presents to the clinic today for evaluation of right leg pain and numbness and tingling. Patient reports he has been dealing with numbness from the knee to the foot for approximately 2 years.   Of note patient was seen for right knee pain on 2020 that demonstrated a subtle medial meniscus tear however he had complete resolution of pain with physical therapy and the knee but pretty much since then has continued to have numbness and tingling.     Patient reports numbness and tingling is most severe over the lateral leg starting at the knee into the fourth and fifth toes.       Patient reports reports intermittent history of lateral right hip pain that has been present for the past several months does note occasional radiation of pain into the right low back as well.     Patient was evaluated by primary care provider who ordered x-rays of the right hip and low back which demonstrated fairly significant osteoarthritis of the low back with the space narrowing was referred to orthopedics for further evaluation which brings him in today. PMHx: [x] Unremarkable [] Diabetes [] HTN  [] Pacemaker   [] MI/Heart Problems [] Cancer [] Arthritis [] Other:               Past Medical History:   Diagnosis Date    Erectile dysfunction 5/4/2018         Past Surgical History:   Procedure Laterality Date    LIPOMA RESECTION  2005    WISDOM TOOTH EXTRACTION Bilateral         Comorbidities:   [x] Obesity [] Dialysis  [] Other:   [] Asthma/COPD [] Dementia [] Other:   [] Stroke [] Sleep apnea [] Other:   [] Vascular disease [] Rheumatic disease [] Other:     Preferred Language:   [x] English           [] Other:    Prior Imaging: MRI 2022  Impression   Disc bulge with midline and right paracentral disc protrusion at L5-S1   resulting in mild bilateral foraminal narrowing and narrowing of the right   lateral recess.  Disc material abuts the descending right S1 nerve root. Previous Treatment:   None   - about 7-8 years ago did PT for low back pain. Medications: [x] Refer to full medical record [] None [] Other:  Allergies:      [x] Refer to full medical record [] None [] Other:    Work Status: instructor -- involves walking and itting      Pain present?  Not currently    Location RLE -- R posterior hip and into the lower leg    Pain Rating currently 0/10   Pain at worse 8/10 with any type of standing of > 7-8 minutes    Pain at best    Description of pain Shooting, numb, stabbing, aching, fiery sensation in the 4th & 5th digit intermittently that hits and goes away    Altered Sensation Numbness the whole R lateral leg and 4th & 5th digits in the toe    What makes it worse Standing or walking    What makes it better Sitting, medication, laying down    Symptom progression Worsening    Sleep Does not wake up from sleep      PLOF:   No change in PLOF compared to CLOF from a functional stand point -- IND will all. Only experiencing increased pain with activities involving standing >7-8 minutes. Patient Goals/Rehab Expectations: to relieve pain and numbness      Objective:    OBSERVATION No Deficit Deficit Comments   Posture          Forward head [x]  []     Rounded shoulders [x]  []     Slumped sitting  [x]  []     kyphosis []  [x]  Mild increase    Lordosis []  []     Lateral Shift [x]  []     Scoliosis [x]  []     Iliac Crest [x]  []     PSIS [x]  []     ASIS [x]  []     Palpation []  [x]  Tight and guarded through R lower lumbar paraspinals.    Mild tenderness with deep palpation to R posterior hip    Joint Mobility  []  [x]  Hypomobile L4-S1 -- no change to pain with PA mobs    Sensation []  [x]   N/T in R lateral lower leg and into the 4th & 5th digits; intact light touch    Edema [x]  []            Neurological [x]  []     Reflexes      Compression/distraction      Gait [x]  []  Limited tolerance to walking    FALL RISK?  x      COMMENTS:   - pt appears to be non fearful or movement and is remaining active         Range of Motion  Left Range of Motion  Right Strength  Left Strength  Right   Lumbar Flexion 75% -- limited by tightness in hamstrimgs      Lumbar Extension 100% -- tight in hip flexors      Lumbar Rotation Full  Full -- pain increase      Lumbar Side Bend Full  Full -- pain increase      Hip Flexion   5 5   Hip Abduction   5 5   Hip Adduction   5 5   Hip Extension   5 5   Knee Flexion   5 5   Knee Extension   5 5   Dorsiflexion   5 5   Plantar flexion   5 5       LUMBAR/SIJ SPECIAL TESTS Left Right   Standing Flexion + []         - [x]           NT []  + []         - [x]           NT []   Tightness in hamstrings    Repeated Flexion + []         - [x]           NT []  + []         - [x]           NT []   No change to symptoms    Repeated Extension + []         - [x]           NT []  + []         - [x]           NT []   No change to symptoms    SLR + []         - [x]           NT []  + []         - [x]           NT []    Slump Test + []         - [x]           NT []  + []         - [x]           NT []    Prone Instability Test + []         - [x]           NT []  + []         - [x]           NT []    Anterior Gapping + []         - [x]           NT []  + []         - [x]           NT []    Posterior Gapping + []         - [x]           NT []  + []         - [x]           NT []    Sacral Thrust + []         - [x]           NT []  + []         - [x]           NT []    Thigh Thrust + []         - []           NT [x]  + []         - []           NT [x]    Gaenslen's + []         - []           NT [x]  + []         - []           NT [x]    Other:  + []         - []           NT []  + []         - []           NT []      HIP SPECIAL TESTS Left Right   BEATA + []         - [x]           NT []  + []         - [x]           NT []    FADIR + []         - [x]           NT []  + []         - [x]           NT []    Scour + []         - []           NT [x]  + []         - []           NT [x]    Trendelenburg Sign + []         - [x]           NT []  + []         - [x]           NT []    Rk + []         - []           NT [x]  + []         - []           NT [x]    Long axis traction + []         - []           NT [x]  + []         - []           NT [x]    Other: + []         - []           NT []  + []         - []           NT []        NEURAL/VASCULAR TESTS Left Right   Sciatic Nerve Tensioning + []         - []           NT []  + []         - [x]           NT []    Tibial Nerve Tensioning + [] - []           NT []  + []         - [x]           NT []    Sural Nerve Tensioning + []         - []           NT []  + [x]         - []           NT []    Common Peroneal Nerve Tensioning + []         - []           NT []  + []         - [x]           NT []    Femoral Nerve Tensioning + []         - []           NT []  + []         - []           NT []    Other: + []         - []           NT []  + []         - []           NT []      MUSCLE LENGTH TESTING Normal Left Tight Right Tight   Glutes []  [x]  [x]    Piriformis []  []  [x]    ITB/TFL []  []  []    Hip Flexors []  [x]  [x]    Quads []  []  []    Hamstrings []  [x]  [x]    Gastrocnemius []  []  []    Soleus []  []  []     []  []  []     []  []  []         Assessment:  Pt presents with radicular type RLE pain and numbness that correlates with imaging indicating  L5/S1 nerve root irritation due to a disc bulge. This pain is impacting him from being able to tolerate standing. With assessment has good range, strength, and overall posture. He is guarded in the R lumbar paraspinals & has lower lumbar hypmobility which is to be expected. Tried movement based assessments with no real change of symptoms with flexion or extension repeated movements. Pt has a low fear of movement, no neurological symptoms, and not a highly physical work job so he would be a candidate for manual traction to try to relieve his symptoms. I also feel that given his pathology he would benefit from extension work too. Began with some general stretching for light traction and extension lying for home program. Feel this patient has a good prognosis for improvement of symptoms with plan including traction for symptom modulation, stretching to reduce guarding, and strengthening to stabilize and reload the lumbar spine.   Patient would continue to benefit from skilled physical therapy services in order to: improve spinal mobility and work to reduce radicular RLE symptoms to allow him to tolerate standing for ADLs better. Problems:    [x] ? Pain:  [x] ? ROM:  [x] ? Strength:  [x] ? Function:  [x] Other: decreased flexibility     STG/LTG: (to be met in 10 treatments)  1. Pt will self report worst pain no greater than 5/10 in order to better tolerate ADLs/work activities with minimal dysfunction  2. Pt will improve AROM of the lumbar spine to full with no increase in pain in order to demonstrate ability to move/reach in all planes unrestricted at PLOF  3. Pt will be independent with home program addressing strength, flexibility and balance to maximize gains made in therapy to improve overall functional capacity for mobility. 4. Pt will self report ability to stand for >15 minutes before pain limits him to demonstrate a functional improvement and better ability to tolerate standing ADLs. 5. Pt will be able to demonstrate centralization of radicular symptoms in order to progress to work hardening tasks for safe return to working activities.    6. Pt will decrease functional limitation per mod ASHWINI to <20% in order to demonstrate improved functional tolerances at PLOF with minimal restriction/dysfunction      Functional Assessment Used: Mod. ASHWINI  Current Status Score: 15/50 = 30% functional limitation   Goal Status Score: <20% functional limitation     Evaluation Complexity:  History (Personal factors, comorbidities) [] 0 [x] 1-2 [] 3+   Exam (limitations, restrictions) [] 1-2 [x] 3 [] 4+   Clinical presentation (progression) [] Stable [x] Evolving  [] Unstable   Decision Making [] Low [x] Moderate [] High    [] Low Complexity [x] Moderate Complexity [] High Complexity     Rehab Potential:  [x] Good  [] Fair  [] Poor   Suggested Professional Referral:  [x] No  [] Yes:  Barriers to Goal Achievement[de-identified]  [x] No  [] Yes:  Domestic Concerns:  [x] No  [] Yes:    Pt. Education:  [x] Plans/Goals, Risks/Benefits discussed  [x] Home exercise program  Method of Education: [x] Verbal  [x] Demo  [x] Written --provided initial HEP 6/13  Comprehension of Education:  [x] Verbalizes understanding. [x] Demonstrates understanding. [] Needs Review. [x] Demonstrates/verbalizes understanding of HEP/Ed previously given.     Treatment Plan:  [x] Therapeutic Exercise   11710  [] Iontophoresis: 4 mg/mL Dexamethasone Sodium Phosphate  mAmin  04690   [x] Therapeutic Activity  76387 [] Vasopneumatic cold with compression  76521    [] Gait Training   07386 [] Ultrasound   35807   [x] Neuromuscular Re-education  99548 [] Electrical Stimulation Unattended  50912   [x] Manual Therapy  12871 [] Electrical Stimulation Attended  74249   [x] Instruction in HEP  [x] Lumbar/Cervical Traction  35502   [] Aquatic Therapy   90521 [x] Cold/hotpack    [] Massage   78325      [] Dry Needling, 1 or 2 muscles  68476   [] Biofeedback, first 15 minutes   30652  [] Biofeedback, additional 15 minutes   56744 [] Dry Needling, 3 or more muscles  47724        Frequency: 1-2 x/week for 10 visits    Todays Treatment:  Modalities:     Exercises:  Exercise Reps/ Time Weight/ Level Comments   Prone on elbows* 2'   No change to symptoms    Prone press ups  x10  No change to distal symptoms    Prone press ups with over pressure  x10  No change to distal symptoms    SKTC* 2x30s ea     Hamstring stretch* 2x30s ea     Other:  (*) = exercises provided as part of HEP     Specific Instructions for next treatment: begin with lumbar traction, work on extension based exercises and lumbar stabilization, manual work to lower lumbar spine as needed     Treatment Charges: Mins Units   [x] Evaluation       []  Low       [x]  Moderate       []  High 32 1   []  Modalities     [x]  Ther Exercise 15 1   []  Manual Therapy     []  Ther Activities     []  Aquatics     []  Neuromuscular     []  Gait Training     []  Dry Needling           1-2 muscles     []  Dry Needling           3 or more muscles     [] Vasocompression     []  Other       Time in: 5:05p   Time Out: 5:52 TOTAL  TIME: 47     Total billable time: 15    Electronically signed by: Nathaly Guido, PT

## 2022-06-20 ENCOUNTER — HOSPITAL ENCOUNTER (OUTPATIENT)
Dept: PHYSICAL THERAPY | Age: 52
Setting detail: THERAPIES SERIES
Discharge: HOME | End: 2022-06-20
Payer: OTHER GOVERNMENT

## 2022-06-20 PROCEDURE — 97140 MANUAL THERAPY 1/> REGIONS: CPT

## 2022-06-20 PROCEDURE — 97012 MECHANICAL TRACTION THERAPY: CPT

## 2022-06-20 PROCEDURE — 97110 THERAPEUTIC EXERCISES: CPT

## 2022-06-20 NOTE — FLOWSHEET NOTE
[facility] Outpatient Physical Therapy   [address]   Phone: [phone]   Fax: [phone]    Physical Therapy Daily Treatment Note    Date:  2022  Patient Name:  Gianfranco Escobar    :  1970  MRN: [id number]  Physician: Huber Wayne MD                     Insurance: [employer] -- based in [place], $50 copay    Medical Diagnosis:       M51.36 (ICD-10-CM) - DDD (degenerative disc disease), lumbar   M54.16 (ICD-10-CM) - Lumbar radiculopathy   M48.061 (ICD-10-CM) - Spinal stenosis, lumbar region, without neurogenic claudication   M43.10 (ICD-10-CM) - Acquired spondylolisthesis                 Rehab Codes: R54.4 chronic low back pain , M25.60 stiffness   Onset Date:                    Next 's appt: 22  Visit# / total visits: 2/10  Cancels/No Shows: 0/0    Subjective:  Pt reports no pain upon arrival but having symptoms in left LE from knee down to ankle. States tingling and numbness felt in LLE but no pain. Pain:  [] Yes  [x] No Location: Numbness Right lateral LE from  Knee to ankle   Pain Rating: (0-10 scale) 0/10  Pain altered Tx:  [x] No  [] Yes  Action:  Comments:    Objective:  Modalities: Mechanical Traction Supine #110 pull 30\" on 10 seconds off 15 minutes     Exercises:  Exercise Reps/ Time Weight/ Level Comments   Prone on elbows* 2'    No change to symptoms    Prone press ups w/ exhale 2 sets x10   1 without exhale, 1 with exhale   Prone press ups with over pressure  x10   No change to distal symptoms    SKTC* 2x30s ea       Hamstring stretch* 2x30s ea       Child's pose                        Other:  Manual: STM to right lumbar paraspinals and gluts 10'     (*) = exercises provided as part of HEP      Specific Instructions for next treatment: begin with lumbar traction, work on extension based exercises and lumbar stabilization, manual work to lower lumbar spine as needed       Assessment: [x] Progressing toward goals.   Initiated treatment with mechanical lumbar traction to decrease symptoms down RLE. Denies any change in symptoms with lumbar traction today. Notes less tightness after traction and manual to lumbar back but no change in radicular symptoms. Added child's pose with right emphasis to decreased tightness along right flank/lumbar back. Denies any changes with symptoms/pain but notes decreased tightness in lumbar back at end of treatment. [] No change. [] Other:    [x] Patient would continue to benefit from skilled physical therapy services in order to:  improve spinal mobility and work to reduce radicular RLE symptoms to allow him to tolerate standing for ADLs better. STG/LTG: (to be met in 10 treatments)  1. Pt will self report worst pain no greater than 5/10 in order to better tolerate ADLs/work activities with minimal dysfunction  2. Pt will improve AROM of the lumbar spine to full with no increase in pain in order to demonstrate ability to move/reach in all planes unrestricted at PLOF  3. Pt will be independent with home program addressing strength, flexibility and balance to maximize gains made in therapy to improve overall functional capacity for mobility. 4. Pt will self report ability to stand for >15 minutes before pain limits him to demonstrate a functional improvement and better ability to tolerate standing ADLs. 5. Pt will be able to demonstrate centralization of radicular symptoms in order to progress to work hardening tasks for safe return to working activities.   6. Pt will decrease functional limitation per mod ASHWINI to <20% in order to demonstrate improved functional tolerances at PLOF with minimal restriction/dysfunction      Pt. Education:  [x] Yes  [] No  [x] Reviewed Prior HEP/Ed  Method of Education: [x] Verbal  [x] Demo  [] Written  Comprehension of Education:  [x] Verbalizes understanding. [x] Demonstrates understanding. [] Needs review. [] Demonstrates/verbalizes HEP/Ed previously given.      Plan: [x] Continue per plan of care.    [] Other:      Treatment Charges: Mins Units   [x]  Modalities- Mechnical Traction 15 1   [x]  Ther Exercise 15 1   [x]  Manual Therapy 10 1   []  Ther Activities     []  Aquatics     []  Neuromuscular     [] Vasocompression     [] Gait Training     [] Dry needling        [] 1 or 2 muscles        [] 3 or more muscles     []  Other     Total Treatment time 40 3     Time In:  3662           Time Out: 1700    Electronically signed by:  Millie Dandy, PTA

## 2022-06-23 ENCOUNTER — HOSPITAL ENCOUNTER (OUTPATIENT)
Dept: PAIN MANAGEMENT | Facility: CLINIC | Age: 52
Discharge: HOME | End: 2022-06-23
Payer: OTHER GOVERNMENT

## 2022-06-23 VITALS
HEART RATE: 81 BPM | DIASTOLIC BLOOD PRESSURE: 76 MMHG | TEMPERATURE: 97.6 F | OXYGEN SATURATION: 96 % | BODY MASS INDEX: 27.2 KG/M2 | WEIGHT: 190 LBS | SYSTOLIC BLOOD PRESSURE: 140 MMHG | RESPIRATION RATE: 12 BRPM | HEIGHT: 70 IN

## 2022-06-23 DIAGNOSIS — R52 PAIN MANAGEMENT: ICD-10-CM

## 2022-06-23 PROCEDURE — 2500000003 HC RX 250 WO HCPCS: Performed by: PAIN MEDICINE

## 2022-06-23 PROCEDURE — 6360000002 HC RX W HCPCS: Performed by: PAIN MEDICINE

## 2022-06-23 PROCEDURE — 62323 NJX INTERLAMINAR LMBR/SAC: CPT

## 2022-06-23 PROCEDURE — 62323 NJX INTERLAMINAR LMBR/SAC: CPT | Performed by: PAIN MEDICINE

## 2022-06-23 RX ORDER — DEXAMETHASONE SODIUM PHOSPHATE 10 MG/ML
INJECTION, SOLUTION INTRAMUSCULAR; INTRAVENOUS
Status: COMPLETED | OUTPATIENT
Start: 2022-06-23 | End: 2022-06-23

## 2022-06-23 RX ORDER — LIDOCAINE HYDROCHLORIDE 10 MG/ML
INJECTION, SOLUTION EPIDURAL; INFILTRATION; INTRACAUDAL; PERINEURAL
Status: COMPLETED | OUTPATIENT
Start: 2022-06-23 | End: 2022-06-23

## 2022-06-23 RX ORDER — MIDAZOLAM HYDROCHLORIDE 2 MG/2ML
INJECTION, SOLUTION INTRAMUSCULAR; INTRAVENOUS
Status: COMPLETED | OUTPATIENT
Start: 2022-06-23 | End: 2022-06-23

## 2022-06-23 RX ADMIN — MIDAZOLAM HYDROCHLORIDE 2 MG: 1 INJECTION, SOLUTION INTRAMUSCULAR; INTRAVENOUS at 08:38

## 2022-06-23 RX ADMIN — DEXAMETHASONE SODIUM PHOSPHATE 10 MG: 10 INJECTION, SOLUTION INTRAMUSCULAR; INTRAVENOUS at 08:41

## 2022-06-23 RX ADMIN — LIDOCAINE HYDROCHLORIDE 2 ML: 10 INJECTION, SOLUTION EPIDURAL; INFILTRATION; INTRACAUDAL at 08:40

## 2022-06-23 ASSESSMENT — PAIN - FUNCTIONAL ASSESSMENT
PAIN_FUNCTIONAL_ASSESSMENT: 0-10
PAIN_FUNCTIONAL_ASSESSMENT: NONE - DENIES PAIN

## 2022-06-23 NOTE — OP NOTE
Caudal Epidural Steroid Injection:   SURGEON: Pilo Joshua MD    PRE-OP DIAGNOSIS: Lumbar radiculopathy,  Low back pain    POST-OP DIAGNOSIS: same. PROCEDURE PERFORMED: Caudal Epidural Steroid Injection. Physician confirmed and marked the surgical site. EBL: minimal    CONSENT: Patient has undergone the educational process with this procedure, is aware and fully understands the risks involved: potential damage to any and all body organs including possible bleeding, infection and nerve injury, allergic reaction and headache. Patient also understands that the procedure will be undertaken in a safe, controlled, and monitored setting. Patient recognizes that the benefits may include relief from pain and reduction in the oral use of medications. Patient agreed to proceed. The patient was counseled at length about the risks of jean pierre Covid-19 during their perioperative period and any recovery window from their procedure. The patient was made aware that jean pierre Covid-19  may worsen their prognosis for recovering from their procedure  and lend to a higher morbidity and/or mortality risk. All material risks, benefits, and reasonable alternatives including postponing the procedure were discussed. The patient does wish to proceed with the procedure at this time. PREP:  Timeout was performed prior to starting the procedure. The patient was prepped with chloraprep and draped sterilely. 5ml of 0.5% lidocaine was used to anesthetize the skin and subcutaneous tissue. PROCEDURE NOTE: A 25 gauge 3.5 inch spinal needle was advanced under fluoroscopic guidance to the sacrococcygeal membrane and into the caudal epidural space. Aspiration was negative. 10mg Dexamethasone mixed with 5 ml of 0.5% Lidocaine was then injected. The needle was withdrawn by the physician and the nurse applied a sterile dressing. The patient tolerated the procedure well. No complications occurred.  Patient transferred to the recovery room in satisfactory condition. Appropriate written discharge instructions given to the patient.     Edgard Chen MD

## 2022-06-23 NOTE — H&P
Pain Pre-Op H&P Note    Jumana Galaviz MD    HPI: Yasmine Leon  presents with back and leg pain. Past Medical History:   Diagnosis Date    Erectile dysfunction 5/4/2018       Past Surgical History:   Procedure Laterality Date    LIPOMA RESECTION  2005    WISDOM TOOTH EXTRACTION Bilateral        Family History   Problem Relation Age of Onset    Hypertension Father     High Cholesterol Father     Liver Cancer Maternal Grandmother     Cancer Paternal Grandmother        No Known Allergies      Current Outpatient Medications:     gabapentin (NEURONTIN) 100 MG capsule, Take 1 capsule by mouth 3 times daily for 20 days. Intended supply: 30 days, Disp: 60 capsule, Rfl: 0    Social History     Tobacco Use    Smoking status: Never Smoker    Smokeless tobacco: Never Used   Substance Use Topics    Alcohol use: Yes     Comment: social       Review of Systems:   Focused review of systems was performed, and negative as pertinent to diagnosis, except as stated in HPI. Physical Exam  Constitutional:       Appearance: Normal appearance. Pulmonary:      Effort: Pulmonary effort is normal.   Neurological:      Mental Status: alert. Psychiatric:         Attention and Perception: Attention and perception normal.         Mood and Affect: Mood and affect normal.   Cardiovascular:      Rate: Normal rate. ASA: 3          Mallampati: 2       Patient's current physical status, medications, medical history, and HPI have been reviewed and updated as appropriate on this date: 06/23/22    Risk/Benefit(s): The risks, benefits, alternatives, and potential complications have been discussed with the patient/family and informed consent has been obtained for the procedure/sedation.     Diagnosis:   radiculopathy      Plan: epidural        Jumana Galaviz MD

## 2022-06-29 ENCOUNTER — HOSPITAL ENCOUNTER (OUTPATIENT)
Dept: PHYSICAL THERAPY | Age: 52
Setting detail: THERAPIES SERIES
Discharge: HOME | End: 2022-06-29
Payer: OTHER GOVERNMENT

## 2022-06-29 PROCEDURE — 97110 THERAPEUTIC EXERCISES: CPT

## 2022-06-29 PROCEDURE — 97140 MANUAL THERAPY 1/> REGIONS: CPT

## 2022-06-29 PROCEDURE — 97012 MECHANICAL TRACTION THERAPY: CPT

## 2022-06-29 NOTE — FLOWSHEET NOTE
[facility]   [address]   Phone: [phone]   Fax: [phone]    Physical Therapy Daily Treatment Note    Date:  2022  Patient Name:  Zita Velarde    :  1970  MRN: [id number]  Physician: Niyah Petty MD                     Insurance: Babita Lakhani -- based in [place], $50 copay    Medical Diagnosis:       M51.36 (ICD-10-CM) - DDD (degenerative disc disease), lumbar   M54.16 (ICD-10-CM) - Lumbar radiculopathy   M48.061 (ICD-10-CM) - Spinal stenosis, lumbar region, without neurogenic claudication   M43.10 (ICD-10-CM) - Acquired spondylolisthesis                 Rehab Codes: R54.4 chronic low back pain , M25.60 stiffness   Onset Date:                    Next 's appt: 22  Visit# / total visits: 3/10  Cancels/No Shows: 0/0    Subjective:  Pt reports no pain upon arrival but having symptoms in left LE from knee down to ankle. Feels they increased as he went to [facility] prior to appointment. States tingling and numbness felt in LLE but no pain. He did see pain management last week for injections with no change in symptoms.      Pain:  [] Yes  [x] No Location: Numbness Right lateral LE from  Knee to ankle   Pain Rating: (0-10 scale) 0/10  Pain altered Tx:  [x] No  [] Yes  Action:  Comments:    Objective:      INTERVENTIONS        Modality  time Weight Comments/response  Completed    Mechanical traction 15 min -- 30:10 115#  Supine with LEs on stool   No immediate changes to symptoms  x          Manual 12' total      STM to lumbar paraspinals  6'  More focus on R  x   PA mobs to SP  3' Grade III - IV   x   PA mobs to R TP  3' Grade III   x                        Exercise Reps/ Time Weight/ Level Comments Completed    Prone on elbows* 2'    No change to symptoms     Prone press ups w/ exhale 2 sets x10    x   Prone press ups with over pressure  x10   No change to distal symptoms     SKTC* 2x30s ea     x   Hamstring stretch* 2x30s ea     Child's pose: center, to L, to R  2x30s ea   x                        Other:    (*) = exercises provided as part of HEP      Specific Instructions for next treatment: begin with lumbar traction, work on extension based exercises and lumbar stabilization, manual work to lower lumbar spine as needed, work on  L sidelying with rotation for gapping        Assessment: [x] Progressing toward goals. Initiated treatment with mechanical lumbar traction to decrease symptoms down RLE. Denies any change in symptoms with lumbar traction today. Completed manual with focus on R side due to tight musculature as it feels tighter vs L. Added mobs to lumbar spine for gapping to try to reduce radicular symptoms. No immediate changes to symptoms post manual work. Continue with stretches to improve flexibility and reduce tension. Denies any changes with symptoms/pain but notes decreased tightness in lumbar back at end of treatment. [] No change. [] Other:    [x] Patient would continue to benefit from skilled physical therapy services in order to:  improve spinal mobility and work to reduce radicular RLE symptoms to allow him to tolerate standing for ADLs better. STG/LTG: (to be met in 10 treatments)  1. Pt will self report worst pain no greater than 5/10 in order to better tolerate ADLs/work activities with minimal dysfunction  2. Pt will improve AROM of the lumbar spine to full with no increase in pain in order to demonstrate ability to move/reach in all planes unrestricted at PLOF  3. Pt will be independent with home program addressing strength, flexibility and balance to maximize gains made in therapy to improve overall functional capacity for mobility. 4. Pt will self report ability to stand for >15 minutes before pain limits him to demonstrate a functional improvement and better ability to tolerate standing ADLs.    5. Pt will be able to demonstrate centralization of radicular symptoms in order to progress to work hardening tasks for safe return to working activities.   6. Pt will decrease functional limitation per mod ASHWINI to <20% in order to demonstrate improved functional tolerances at PLOF with minimal restriction/dysfunction      Pt. Education:  [x] Yes  [] No  [x] Reviewed Prior HEP/Ed  Method of Education: [x] Verbal  [x] Demo  [] Written  Comprehension of Education:  [x] Verbalizes understanding. [x] Demonstrates understanding. [] Needs review. [] Demonstrates/verbalizes HEP/Ed previously given. Plan: [x] Continue per plan of care.    [] Other:      Treatment Charges: Mins Units   [x]  Modalities- Mechnical Traction 15 1   [x]  Ther Exercise 18 1   [x]  Manual Therapy 12 1   []  Ther Activities     []  Aquatics     []  Neuromuscular     [] Vasocompression     [] Gait Training     [] Dry needling        [] 1 or 2 muscles        [] 3 or more muscles     []  Other     Total Treatment time 45 3     Time In:  7088           Time Out: 3454    Electronically signed by:  Teto Dickey PT

## 2022-06-30 ENCOUNTER — APPOINTMENT (OUTPATIENT)
Dept: PHYSICAL THERAPY | Age: 52
End: 2022-06-30
Payer: OTHER GOVERNMENT

## 2022-07-05 ENCOUNTER — HOSPITAL ENCOUNTER (OUTPATIENT)
Dept: PHYSICAL THERAPY | Age: 52
Setting detail: THERAPIES SERIES
Discharge: HOME | End: 2022-07-05
Payer: OTHER GOVERNMENT

## 2022-07-05 ENCOUNTER — CLINICAL DOCUMENTATION (OUTPATIENT)
Dept: PHYSICAL THERAPY | Age: 52
End: 2022-07-05

## 2022-07-05 PROCEDURE — 97012 MECHANICAL TRACTION THERAPY: CPT

## 2022-07-05 PROCEDURE — 97140 MANUAL THERAPY 1/> REGIONS: CPT

## 2022-07-05 PROCEDURE — 97110 THERAPEUTIC EXERCISES: CPT

## 2022-07-05 NOTE — FLOWSHEET NOTE
[address] [facility]   [address]   Phone: [phone]   Fax: [phone]    Physical Therapy Daily Treatment Note    Date:  2022  Patient Name:  Naif Rose    :  1970  MRN: [id number]  Physician: Los Beckman MD                     Insurance: Miguelina Guan -- based in [place], $50 copay    Medical Diagnosis:       M51.36 (ICD-10-CM) - DDD (degenerative disc disease), lumbar   M54.16 (ICD-10-CM) - Lumbar radiculopathy   M48.061 (ICD-10-CM) - Spinal stenosis, lumbar region, without neurogenic claudication   M43.10 (ICD-10-CM) - Acquired spondylolisthesis                 Rehab Codes: R54.4 chronic low back pain , M25.60 stiffness   Onset Date:                    Next 's appt: 22  Visit# / total visits: /10  Cancels/No Shows: 0/0    Subjective:  Pt reports no pain upon arrival but having symptoms in R LE from knee down to ankle. He notes that therapy has not changed these symptoms. He plans to have the second injection with pain management  and wants to hold therapy until then. He notes he is still getting the shooting pain with standing and walking on occasion. Does not ever happen in therapy.      Pain:  [] Yes  [x] No Location: Numbness Right lateral LE from  Knee to ankle   Pain Rating: (0-10 scale) 0/10  Pain altered Tx:  [x] No  [] Yes  Action:  Comments:    Objective:      INTERVENTIONS        Modality  time Weight Comments/response  Completed    Mechanical traction 15 min -- 30:10 115#  Supine with LEs on stool   No immediate changes to symptoms  x          Manual 16' total      STM to lumbar paraspinals  6'  More focus on R  x   PA mobs to SP  5' Grade III - IV   x   PA mobs to R & L  TP  5' Grade III   x                        Exercise Reps/ Time Weight/ Level Comments Completed    Prone on elbows* 2'    No change to symptoms     Prone press ups w/ exhale 2 sets x10    x   Prone press ups with over pressure  x10   No change to distal symptoms     SKTC* 2x30s ea        Hamstring stretch* 2x30s ea        Child's pose: center, to L, to R  2x30s ea   x   LTR  x20    x   L sidelying QL stretch ( for R QL)  3x30s   Therapist over pressure  x   L sidelying open books  x15   x   Supine knee to opposite shoulder  2x30s ea   x   Other:    (*) = exercises provided as part of HEP      Specific Instructions for next treatment: begin with lumbar traction, work on extension based exercises and lumbar stabilization, manual work to lower lumbar spine as needed, work on  L sidelying with rotation for gapping, work on standing and walking tolerance         Assessment: [x] Progressing toward goals. Began treatment with manual work to improve spinal mobility and create some gapping to reduce tension on potential nerve roots in the foramen. Added in flexion/rotation stretching and movement to see if this would have any changes on symptoms. No immediate changes to symptoms but discussed how this creates a gapping type motion to keep pressure off the nerve roots. Encouraged him to continue this at home over the next couple weeks to see if this changes symptoms. Ended session with traction as pt feels this helps with the gapping and makes him feel less tension in the lumbar spine. Pt is to see pain management in about 1 week for 2nd round of injections. Will let him see how these symptoms tolerate next injections and will follow up after them. At further visits will add more standing and walking interventions to see if this improves pain. [] No change. [] Other:    [x] Patient would continue to benefit from skilled physical therapy services in order to:  improve spinal mobility and work to reduce radicular RLE symptoms to allow him to tolerate standing for ADLs better. STG/LTG: (to be met in 10 treatments)  1. Pt will self report worst pain no greater than 5/10 in order to better tolerate ADLs/work activities with minimal dysfunction  2.  Pt will improve AROM of the lumbar spine to full with no increase in pain in order to demonstrate ability to move/reach in all planes unrestricted at PLOF  3. Pt will be independent with home program addressing strength, flexibility and balance to maximize gains made in therapy to improve overall functional capacity for mobility. 4. Pt will self report ability to stand for >15 minutes before pain limits him to demonstrate a functional improvement and better ability to tolerate standing ADLs. 5. Pt will be able to demonstrate centralization of radicular symptoms in order to progress to work hardening tasks for safe return to working activities.   6. Pt will decrease functional limitation per mod ASHWINI to <20% in order to demonstrate improved functional tolerances at PLOF with minimal restriction/dysfunction      Pt. Education:  [x] Yes  [] No  [x] Reviewed Prior HEP/Ed  Method of Education: [x] Verbal  [x] Demo  [] Written  Comprehension of Education:  [x] Verbalizes understanding. [x] Demonstrates understanding. [] Needs review. [] Demonstrates/verbalizes HEP/Ed previously given. Plan: [x] Continue per plan of care.    [] Other:      Treatment Charges: Mins Units   [x]  Modalities- Mechnical Traction 15 1   [x]  Ther Exercise 14 1   [x]  Manual Therapy 16 1   []  Ther Activities     []  Aquatics     []  Neuromuscular     [] Vasocompression     [] Gait Training     [] Dry needling        [] 1 or 2 muscles        [] 3 or more muscles     []  Other     Total Treatment time 45 3     Time In:  1914           Time Out: 0499 ( -3 minutes for traction set up)     Electronically signed by:  Lexi Morrissey, PT

## 2022-07-14 ENCOUNTER — HOSPITAL ENCOUNTER (OUTPATIENT)
Dept: PAIN MANAGEMENT | Facility: CLINIC | Age: 52
Discharge: HOME | End: 2022-07-14
Payer: OTHER GOVERNMENT

## 2022-07-14 VITALS
HEART RATE: 77 BPM | DIASTOLIC BLOOD PRESSURE: 89 MMHG | SYSTOLIC BLOOD PRESSURE: 127 MMHG | HEIGHT: 70 IN | WEIGHT: 190 LBS | RESPIRATION RATE: 16 BRPM | TEMPERATURE: 98 F | BODY MASS INDEX: 27.2 KG/M2 | OXYGEN SATURATION: 94 %

## 2022-07-14 DIAGNOSIS — R52 PAIN MANAGEMENT: ICD-10-CM

## 2022-07-14 PROCEDURE — 2500000003 HC RX 250 WO HCPCS: Performed by: PAIN MEDICINE

## 2022-07-14 PROCEDURE — 62323 NJX INTERLAMINAR LMBR/SAC: CPT

## 2022-07-14 PROCEDURE — 62323 NJX INTERLAMINAR LMBR/SAC: CPT | Performed by: PAIN MEDICINE

## 2022-07-14 PROCEDURE — 6360000002 HC RX W HCPCS: Performed by: PAIN MEDICINE

## 2022-07-14 PROCEDURE — 6360000004 HC RX CONTRAST MEDICATION: Performed by: PAIN MEDICINE

## 2022-07-14 RX ORDER — MIDAZOLAM HYDROCHLORIDE 2 MG/2ML
INJECTION, SOLUTION INTRAMUSCULAR; INTRAVENOUS
Status: COMPLETED | OUTPATIENT
Start: 2022-07-14 | End: 2022-07-14

## 2022-07-14 RX ORDER — LIDOCAINE HYDROCHLORIDE 10 MG/ML
INJECTION, SOLUTION EPIDURAL; INFILTRATION; INTRACAUDAL; PERINEURAL
Status: COMPLETED | OUTPATIENT
Start: 2022-07-14 | End: 2022-07-14

## 2022-07-14 RX ORDER — IBUPROFEN 400 MG/1
400 TABLET ORAL EVERY 6 HOURS PRN
COMMUNITY

## 2022-07-14 RX ORDER — DEXAMETHASONE SODIUM PHOSPHATE 10 MG/ML
INJECTION, SOLUTION INTRAMUSCULAR; INTRAVENOUS
Status: COMPLETED | OUTPATIENT
Start: 2022-07-14 | End: 2022-07-14

## 2022-07-14 RX ADMIN — MIDAZOLAM HYDROCHLORIDE 2 MG: 1 INJECTION, SOLUTION INTRAMUSCULAR; INTRAVENOUS at 13:46

## 2022-07-14 RX ADMIN — LIDOCAINE HYDROCHLORIDE 3 ML: 10 INJECTION, SOLUTION EPIDURAL; INFILTRATION; INTRACAUDAL at 13:51

## 2022-07-14 RX ADMIN — DEXAMETHASONE SODIUM PHOSPHATE 10 MG: 10 INJECTION, SOLUTION INTRAMUSCULAR; INTRAVENOUS at 13:53

## 2022-07-14 RX ADMIN — IOHEXOL 3 ML: 180 INJECTION INTRAVENOUS at 13:52

## 2022-07-14 ASSESSMENT — PAIN DESCRIPTION - DESCRIPTORS: DESCRIPTORS: NUMBNESS;THROBBING

## 2022-07-14 ASSESSMENT — PAIN - FUNCTIONAL ASSESSMENT
PAIN_FUNCTIONAL_ASSESSMENT: 0-10
PAIN_FUNCTIONAL_ASSESSMENT: PREVENTS OR INTERFERES SOME ACTIVE ACTIVITIES AND ADLS

## 2022-07-14 NOTE — OP NOTE
Caudal Epidural Steroid Injection:   SURGEON: Adali Alfaro MD    PRE-OP DIAGNOSIS: Lumbar radiculopathy,  Low back pain    POST-OP DIAGNOSIS: same. PROCEDURE PERFORMED: Caudal Epidural Steroid Injection. Physician confirmed and marked the surgical site. EBL: minimal    CONSENT: Patient has undergone the educational process with this procedure, is aware and fully understands the risks involved: potential damage to any and all body organs including possible bleeding, infection and nerve injury, allergic reaction and headache. Patient also understands that the procedure will be undertaken in a safe, controlled, and monitored setting. Patient recognizes that the benefits may include relief from pain and reduction in the oral use of medications. Patient agreed to proceed. The patient was counseled at length about the risks of jean pierre Covid-19 during their perioperative period and any recovery window from their procedure. The patient was made aware that jean pierre Covid-19  may worsen their prognosis for recovering from their procedure  and lend to a higher morbidity and/or mortality risk. All material risks, benefits, and reasonable alternatives including postponing the procedure were discussed. The patient does wish to proceed with the procedure at this time. PREP:  Timeout was performed prior to starting the procedure. The patient was prepped with chloraprep and draped sterilely. 5ml of 0.5% lidocaine was used to anesthetize the skin and subcutaneous tissue. PROCEDURE NOTE: A 25 gauge 3.5 inch spinal needle was advanced under fluoroscopic guidance to the sacrococcygeal membrane and into the caudal epidural space. 1 ml of (omnipaque) contrast was then injected and spread along the epidural space. Aspiration was negative. 10mg Dexamethasone mixed with 5 ml of 0.5% Lidocaine was then injected. The needle was withdrawn by the physician and the nurse applied a sterile dressing.  The patient tolerated the procedure well. No complications occurred. Patient transferred to the recovery room in satisfactory condition. Appropriate written discharge instructions given to the patient.     Kodak Romero MD

## 2022-07-14 NOTE — H&P
Pain Pre-Op H&P Note    Piedad De Los Santos MD    HPI: Debbie Soto  presents with back and leg pain. Past Medical History:   Diagnosis Date    Erectile dysfunction 5/4/2018       Past Surgical History:   Procedure Laterality Date    LIPOMA RESECTION  2005    PAIN MANAGEMENT PROCEDURE      WISDOM TOOTH EXTRACTION Bilateral        Family History   Problem Relation Age of Onset    Hypertension Father     High Cholesterol Father     Liver Cancer Maternal Grandmother     Cancer Paternal Grandmother        No Known Allergies      Current Outpatient Medications:     ibuprofen (ADVIL;MOTRIN) 400 MG tablet, Take 400 mg by mouth every 6 hours as needed for Pain, Disp: , Rfl:     gabapentin (NEURONTIN) 100 MG capsule, Take 1 capsule by mouth 3 times daily for 20 days. Intended supply: 30 days, Disp: 60 capsule, Rfl: 0    Social History     Tobacco Use    Smoking status: Never Smoker    Smokeless tobacco: Never Used   Substance Use Topics    Alcohol use: Yes     Comment: social       Review of Systems:   Focused review of systems was performed, and negative as pertinent to diagnosis, except as stated in HPI. Physical Exam  Constitutional:       Appearance: Normal appearance. Pulmonary:      Effort: Pulmonary effort is normal.   Neurological:      Mental Status: alert. Psychiatric:         Attention and Perception: Attention and perception normal.         Mood and Affect: Mood and affect normal.   Cardiovascular:      Rate: Normal rate. ASA: 3          Mallampati: 2       Patient's current physical status, medications, medical history, and HPI have been reviewed and updated as appropriate on this date: 07/14/22    Risk/Benefit(s): The risks, benefits, alternatives, and potential complications have been discussed with the patient/family and informed consent has been obtained for the procedure/sedation.     Diagnosis:   radiculopathy      Plan: epidural        Piedad De Los Santos MD

## 2022-07-28 ENCOUNTER — OFFICE VISIT (OUTPATIENT)
Dept: PAIN MANAGEMENT | Age: 52
End: 2022-07-28
Payer: OTHER GOVERNMENT

## 2022-07-28 VITALS — HEIGHT: 70 IN | WEIGHT: 194 LBS | BODY MASS INDEX: 27.77 KG/M2

## 2022-07-28 DIAGNOSIS — M54.17 LUMBOSACRAL NEURITIS: Primary | ICD-10-CM

## 2022-07-28 DIAGNOSIS — M54.16 LUMBAR RADICULOPATHY: ICD-10-CM

## 2022-07-28 PROCEDURE — 99213 OFFICE O/P EST LOW 20 MIN: CPT | Performed by: NURSE PRACTITIONER

## 2022-07-28 RX ORDER — PREGABALIN 50 MG/1
50 CAPSULE ORAL 2 TIMES DAILY
Qty: 60 CAPSULE | Refills: 0 | Status: SHIPPED | OUTPATIENT
Start: 2022-07-28 | End: 2022-08-25 | Stop reason: ALTCHOICE

## 2022-07-28 ASSESSMENT — ENCOUNTER SYMPTOMS
CONSTIPATION: 0
COUGH: 0
SHORTNESS OF BREATH: 0
BACK PAIN: 1

## 2022-07-28 NOTE — PROGRESS NOTES
Chief Complaint: hip pain, back pain      PMH  Pt complains of pain in his right leg that radiates from the buttock to the foot. MRI lumbar with degenerative changes and disc bulging at L5-S1 - right paracentral disc protrusion at L5-S1 resulting in mild bilateral foraminal narrowing and narrowing of the right lateral recess. Disc material abuts the descending right S1 nerve root. He has seen a surgeon who recommended LESI and if pain was not relieved would consider surgery - L5-S1 PLIF. Pt had caudal epidural steroid injection x2 and reports no relief of radicular pain. He continues physical therapy without much benefit. He tried gabapentin for the pain but states he d/c due to side effects. Back Pain  This is a chronic problem. The current episode started more than 1 year ago. The problem is unchanged. Pain location: right leg from buttock to foot. Quality: throbbing. The pain is moderate. The symptoms are aggravated by position and standing. Pertinent negatives include no chest pain or fever. Treatments tried: PT. The treatment provided mild relief. Patient denies any new neurological symptoms. No bowel or bladder incontinence, no weakness, and no falling. Past Medical History:   Diagnosis Date    Erectile dysfunction 5/4/2018       Past Surgical History:   Procedure Laterality Date    LIPOMA RESECTION  2005    PAIN MANAGEMENT PROCEDURE      WISDOM TOOTH EXTRACTION Bilateral        No Known Allergies      Current Outpatient Medications:     ibuprofen (ADVIL;MOTRIN) 400 MG tablet, Take 400 mg by mouth every 6 hours as needed for Pain, Disp: , Rfl:     gabapentin (NEURONTIN) 100 MG capsule, Take 1 capsule by mouth 3 times daily for 20 days.  Intended supply: 30 days, Disp: 60 capsule, Rfl: 0    Family History   Problem Relation Age of Onset    Hypertension Father     High Cholesterol Father     Liver Cancer Maternal Grandmother     Cancer Paternal Grandmother        Social History Socioeconomic History    Marital status: Unknown     Spouse name: Not on file    Number of children: Not on file    Years of education: Not on file    Highest education level: Not on file   Occupational History    Not on file   Tobacco Use    Smoking status: Never    Smokeless tobacco: Never   Vaping Use    Vaping Use: Never used   Substance and Sexual Activity    Alcohol use: Yes     Comment: social    Drug use: No    Sexual activity: Yes     Partners: Female   Other Topics Concern    Not on file   Social History Narrative    Not on file     Social Determinants of Health     Financial Resource Strain: Low Risk     Difficulty of Paying Living Expenses: Not hard at all   Food Insecurity: No Food Insecurity    Worried About Running Out of Food in the Last Year: Never true    Augie of Food in the Last Year: Never true   Transportation Needs: Not on file   Physical Activity: Sufficiently Active    Days of Exercise per Week: 5 days    Minutes of Exercise per Session: 30 min   Stress: Not on file   Social Connections: Not on file   Intimate Partner Violence: Not At Risk    Fear of Current or Ex-Partner: No    Emotionally Abused: No    Physically Abused: No    Sexually Abused: No   Housing Stability: Not on file       Review of Systems:  Review of Systems   Constitutional: Negative for chills and fever. Cardiovascular:  Negative for chest pain and palpitations. Respiratory:  Negative for cough and shortness of breath. Musculoskeletal:  Positive for back pain. Gastrointestinal:  Negative for constipation. Neurological:  Negative for disturbances in coordination and loss of balance. Physical Exam:  Ht 5' 10\" (1.778 m)   Wt 194 lb (88 kg)   BMI 27.84 kg/m²     Physical Exam  Constitutional:        HENT:      Head: Normocephalic. Pulmonary:      Effort: Pulmonary effort is normal.   Musculoskeletal:         General: Normal range of motion. Cervical back: Normal range of motion.    Skin: General: Skin is warm and dry. Neurological:      Mental Status: He is alert and oriented to person, place, and time. Record/Diagnostics Review:    FINDINGS:   BONES/ALIGNMENT: Vertebral body heights are maintained. There is   age-appropriate bone marrow signal.  There is degenerative endplate change at   Q0-T2. There is multilevel degenerative disc disease with loss of disc   signal.  There is minimal disc space narrowing at L5-S1. There is no   spondylolisthesis. SPINAL CORD: Conus is normal in caliber and signal and terminates at the L1   level. The cauda equina is unremarkable. SOFT TISSUES: The posterior paraspinal soft tissues are unremarkable. The   visualized abdominal structures are unremarkable. L1-L2: There is no significant disc herniation, spinal canal stenosis or   neural foraminal narrowing. L2-L3: There is a circumferential disc bulge. There is no canal stenosis or   foraminal narrowing. L3-L4: There is a circumferential disc bulge. There is no canal stenosis or   foraminal narrowing. L4-L5: There is a circumferential disc bulge with a superimposed midline disc   protrusion. There is no canal stenosis or foraminal narrowing. L5-S1: There is a circumferential disc bulge with a midline and right   paracentral disc protrusion. There is no canal stenosis. There is mild   bilateral foraminal narrowing. There is narrowing of the right lateral   recess and disc material abuts the descending right S1 nerve root. Impression   Disc bulge with midline and right paracentral disc protrusion at L5-S1   resulting in mild bilateral foraminal narrowing and narrowing of the right   lateral recess. Disc material abuts the descending right S1 nerve root.        Assessment:  Problem List Items Addressed This Visit       Lumbar radiculopathy    Relevant Medications    pregabalin (LYRICA) 50 MG capsule     Other Visit Diagnoses       Lumbosacral

## 2022-08-25 ENCOUNTER — OFFICE VISIT (OUTPATIENT)
Dept: PAIN MANAGEMENT | Age: 52
End: 2022-08-25
Payer: OTHER GOVERNMENT

## 2022-08-25 VITALS — HEIGHT: 70 IN | WEIGHT: 194 LBS | BODY MASS INDEX: 27.77 KG/M2

## 2022-08-25 DIAGNOSIS — M54.16 LUMBAR RADICULOPATHY: ICD-10-CM

## 2022-08-25 DIAGNOSIS — M54.17 LUMBOSACRAL NEURITIS: Primary | ICD-10-CM

## 2022-08-25 PROCEDURE — 99213 OFFICE O/P EST LOW 20 MIN: CPT | Performed by: NURSE PRACTITIONER

## 2022-08-25 RX ORDER — PREGABALIN 100 MG/1
100 CAPSULE ORAL 2 TIMES DAILY
Qty: 60 CAPSULE | Refills: 0 | Status: SHIPPED | OUTPATIENT
Start: 2022-08-25 | End: 2022-09-23 | Stop reason: SDUPTHER

## 2022-08-25 ASSESSMENT — ENCOUNTER SYMPTOMS
BACK PAIN: 1
SHORTNESS OF BREATH: 0
CONSTIPATION: 0
BOWEL INCONTINENCE: 0
COUGH: 0

## 2022-08-25 NOTE — PROGRESS NOTES
Chief Complaint   Patient presents with    Back Pain    Hip Pain         PMH Pt complains of pain in his right leg that radiates from the buttock to the foot. MRI lumbar with degenerative changes and disc bulging at L5-S1 - right paracentral disc protrusion at L5-S1 resulting in mild bilateral foraminal narrowing and narrowing of the right lateral recess. Disc material abuts the descending right S1 nerve root. He has seen a surgeon who recommended LESI and if pain was not relieved would consider surgery - L5-S1 PLIF. Pt had caudal epidural steroid injection x2 and reports no relief of radicular pain. He continues physical therapy without much benefit. He tried gabapentin for the pain but states he d/c due to side effects. Started on Lyrica 50 mg BID last visit with some benefit. Denies side effects. Back Pain  This is a chronic problem. The current episode started more than 1 year ago. The problem occurs constantly. The problem is unchanged. The pain is present in the lumbar spine and sacro-iliac. The quality of the pain is described as aching. The pain radiates to the right thigh, right foot and right knee. The pain is at a severity of 0/10. The pain is mild. The pain is The same all the time. The symptoms are aggravated by standing. Associated symptoms include numbness and tingling. Pertinent negatives include no bladder incontinence, bowel incontinence, chest pain or fever. He has tried nothing for the symptoms. The treatment provided no relief. Hip Pain   The incident occurred more than 1 week ago. The pain is present in the right hip, right thigh, right leg and right knee. The pain is at a severity of 0/10. The patient is experiencing no pain. The pain has been Fluctuating since onset. Associated symptoms include numbness and tingling. The symptoms are aggravated by movement. He has tried nothing for the symptoms. The treatment provided no relief.        Patient denies any new neurological symptoms. No bowel or bladder incontinence, no weakness, and no falling. Past Medical History:   Diagnosis Date    Erectile dysfunction 5/4/2018    Lumbar radiculopathy 7/28/2022       Past Surgical History:   Procedure Laterality Date    LIPOMA RESECTION  2005    PAIN MANAGEMENT PROCEDURE      WISDOM TOOTH EXTRACTION Bilateral        No Known Allergies      Current Outpatient Medications:     pregabalin (LYRICA) 50 MG capsule, Take 1 capsule by mouth in the morning and 1 capsule before bedtime. Do all this for 30 days. , Disp: 60 capsule, Rfl: 0    ibuprofen (ADVIL;MOTRIN) 400 MG tablet, Take 400 mg by mouth every 6 hours as needed for Pain, Disp: , Rfl:     Family History   Problem Relation Age of Onset    Hypertension Father     High Cholesterol Father     Liver Cancer Maternal Grandmother     Cancer Paternal Grandmother        Social History     Socioeconomic History    Marital status: Unknown     Spouse name: Not on file    Number of children: Not on file    Years of education: Not on file    Highest education level: Not on file   Occupational History    Not on file   Tobacco Use    Smoking status: Never    Smokeless tobacco: Never   Vaping Use    Vaping Use: Never used   Substance and Sexual Activity    Alcohol use: Yes     Comment: social    Drug use: No    Sexual activity: Yes     Partners: Female   Other Topics Concern    Not on file   Social History Narrative    Not on file     Social Determinants of Health     Financial Resource Strain: Low Risk     Difficulty of Paying Living Expenses: Not hard at all   Food Insecurity: No Food Insecurity    Worried About Running Out of Food in the Last Year: Never true    Ran Out of Food in the Last Year: Never true   Transportation Needs: Not on file   Physical Activity: Sufficiently Active    Days of Exercise per Week: 5 days    Minutes of Exercise per Session: 30 min   Stress: Not on file   Social Connections: Not on file   Intimate Partner Violence: Not At Risk protrusion. There is no canal stenosis. There is mild   bilateral foraminal narrowing. There is narrowing of the right lateral   recess and disc material abuts the descending right S1 nerve root. Impression   Disc bulge with midline and right paracentral disc protrusion at L5-S1   resulting in mild bilateral foraminal narrowing and narrowing of the right   lateral recess. Disc material abuts the descending right S1 nerve root. Assessment:  Problem List Items Addressed This Visit       Lumbar radiculopathy    Relevant Medications    pregabalin (LYRICA) 100 MG capsule     Other Visit Diagnoses       Lumbosacral neuritis    -  Primary    Relevant Medications    pregabalin (LYRICA) 100 MG capsule               Treatment Plan:  Patient reports pain is somewhat decreased with Lyrica. Denies side effects. Still complains of numbness in the right leg and increased back pain with prolonged standing and walking. He is still considering back surgery - states his brother is a thoracic surgeon and recommended a NS. Will increase Lyrica to 100 mg BID  Follow up in 4 weeks    I have reviewed the chief complaint and history of present illness (including ROS and PFSH) and vital documentation by my staff and I agree with their documentation and have added where applicable.

## 2022-09-23 ENCOUNTER — TELEMEDICINE (OUTPATIENT)
Dept: PAIN MANAGEMENT | Age: 52
End: 2022-09-23

## 2022-09-23 DIAGNOSIS — M54.17 LUMBOSACRAL NEURITIS: ICD-10-CM

## 2022-09-23 DIAGNOSIS — M54.16 LUMBAR RADICULOPATHY: ICD-10-CM

## 2022-09-23 RX ORDER — PREGABALIN 100 MG/1
100 CAPSULE ORAL 2 TIMES DAILY
Qty: 60 CAPSULE | Refills: 1 | Status: SHIPPED | OUTPATIENT
Start: 2022-09-23 | End: 2022-11-22

## 2022-09-23 ASSESSMENT — ENCOUNTER SYMPTOMS
SHORTNESS OF BREATH: 0
COUGH: 0
CONSTIPATION: 0
BACK PAIN: 1

## 2022-09-23 NOTE — PROGRESS NOTES
Chief Complaint: back and right hip pain    PMH  Pt complains of pain in his right leg that radiates from the buttock to the foot. MRI lumbar with degenerative changes and disc bulging at L5-S1 - right paracentral disc protrusion at L5-S1 resulting in mild bilateral foraminal narrowing and narrowing of the right lateral recess. Disc material abuts the descending right S1 nerve root. He has seen a surgeon who recommended LESI and if pain was not relieved would consider surgery - L5-S1 PLIF. Pt had caudal epidural steroid injection x2 and reports no relief of radicular pain. He continues physical therapy without much benefit. He tried gabapentin for the pain but states he d/c due to side effects. Started on Lyrica and titrated up to 100 mg BID last visit with some benefit. Denies side effects. Back Pain  This is a chronic problem. The current episode started more than 1 year ago. The problem occurs constantly. The problem is unchanged. The pain is present in the lumbar spine. The quality of the pain is described as aching. The pain radiates to the right foot, right knee and right thigh. The pain is at a severity of 2/10. The pain is mild. The pain is The same all the time. The symptoms are aggravated by position, standing and twisting. Associated symptoms include numbness. Pertinent negatives include no chest pain, fever, headaches, tingling or weakness. He has tried bed rest, home exercises, heat, ice, walking and NSAIDs for the symptoms. Hip Pain   The incident occurred more than 1 week ago. The injury mechanism was an inversion injury. The pain is present in the right hip. The quality of the pain is described as aching. The pain is at a severity of 2/10. The pain is mild. The pain has been Constant since onset. Associated symptoms include numbness. Pertinent negatives include no tingling. He reports no foreign bodies present. The symptoms are aggravated by movement and weight bearing.  He has tried acetaminophen, heat, ice, elevation, immobilization, NSAIDs and rest for the symptoms. The treatment provided no relief. Patient denies any new neurological symptoms. No bowel or bladder incontinence, no weakness, and no falling. Past Medical History:   Diagnosis Date    Erectile dysfunction 5/4/2018    Lumbar radiculopathy 7/28/2022       Past Surgical History:   Procedure Laterality Date    LIPOMA RESECTION  2005    PAIN MANAGEMENT PROCEDURE      WISDOM TOOTH EXTRACTION Bilateral        No Known Allergies      Current Outpatient Medications:     pregabalin (LYRICA) 100 MG capsule, Take 1 capsule by mouth 2 times daily for 30 days. , Disp: 60 capsule, Rfl: 0    ibuprofen (ADVIL;MOTRIN) 400 MG tablet, Take 400 mg by mouth every 6 hours as needed for Pain, Disp: , Rfl:     Family History   Problem Relation Age of Onset    Hypertension Father     High Cholesterol Father     Liver Cancer Maternal Grandmother     Cancer Paternal Grandmother        Social History     Socioeconomic History    Marital status: Unknown     Spouse name: Not on file    Number of children: Not on file    Years of education: Not on file    Highest education level: Not on file   Occupational History    Not on file   Tobacco Use    Smoking status: Never    Smokeless tobacco: Never   Vaping Use    Vaping Use: Never used   Substance and Sexual Activity    Alcohol use: Yes     Comment: social    Drug use: No    Sexual activity: Yes     Partners: Female   Other Topics Concern    Not on file   Social History Narrative    Not on file     Social Determinants of Health     Financial Resource Strain: Low Risk     Difficulty of Paying Living Expenses: Not hard at all   Food Insecurity: No Food Insecurity    Worried About Running Out of Food in the Last Year: Never true    Ran Out of Food in the Last Year: Never true   Transportation Needs: Not on file   Physical Activity: Sufficiently Active    Days of Exercise per Week: 5 days    Minutes of narrowing. L4-L5: There is a circumferential disc bulge with a superimposed midline disc   protrusion. There is no canal stenosis or foraminal narrowing. L5-S1: There is a circumferential disc bulge with a midline and right   paracentral disc protrusion. There is no canal stenosis. There is mild   bilateral foraminal narrowing. There is narrowing of the right lateral   recess and disc material abuts the descending right S1 nerve root. Impression   Disc bulge with midline and right paracentral disc protrusion at L5-S1   resulting in mild bilateral foraminal narrowing and narrowing of the right   lateral recess. Disc material abuts the descending right S1 nerve root. Assessment:  Problem List Items Addressed This Visit       Lumbar radiculopathy    Relevant Medications    pregabalin (LYRICA) 100 MG capsule     Other Visit Diagnoses       Lumbosacral neuritis        Relevant Medications    pregabalin (LYRICA) 100 MG capsule               Treatment Plan:  Continue Lyrica 100 mg BID - feels pain is well manages at this dose  He is going to hold off on seeing a surgeon at this time  Considering starting an exercise program   Follow up in 8 weeks    I have reviewed the chief complaint and history of present illness (including ROS and PFSH) and vital documentation by my staff and I agree with their documentation and have added where applicable. Sandy Wagner, was evaluated through a synchronous (real-time) audio-video encounter. The patient (or guardian if applicable) is aware that this is a billable service, which includes applicable co-pays. This Virtual Visit was conducted with patient's (and/or legal guardian's) consent. The visit was conducted pursuant to the emergency declaration under the [address] [place] [address] authority and the [employer] and [employer] General Act.   Patient identification was verified, and a caregiver was present when appropriate. The patient was located at Home: [address]  [address]. Provider was located at [place] [address]): [address] [facility],  [address] [place] [address]. Total time spent for this encounter: Not billed by time    --ROXY Amor CNP on 10/10/2022 at 12:59 PM    An electronic signature was used to authenticate this note.

## 2022-11-18 ENCOUNTER — TELEMEDICINE (OUTPATIENT)
Dept: PAIN MANAGEMENT | Age: 52
End: 2022-11-18
Payer: OTHER GOVERNMENT

## 2022-11-18 DIAGNOSIS — M54.17 LUMBOSACRAL NEURITIS: ICD-10-CM

## 2022-11-18 DIAGNOSIS — M54.16 LUMBAR RADICULOPATHY: ICD-10-CM

## 2022-11-18 PROCEDURE — 99422 OL DIG E/M SVC 11-20 MIN: CPT | Performed by: NURSE PRACTITIONER

## 2022-11-18 RX ORDER — PREGABALIN 100 MG/1
100 CAPSULE ORAL 2 TIMES DAILY
Qty: 60 CAPSULE | Refills: 1 | Status: CANCELLED | OUTPATIENT
Start: 2022-11-18 | End: 2023-01-17

## 2022-11-18 ASSESSMENT — ENCOUNTER SYMPTOMS
COUGH: 0
BACK PAIN: 1
CONSTIPATION: 0
SHORTNESS OF BREATH: 0

## 2022-11-18 NOTE — PROGRESS NOTES
Chief Complaint   Patient presents with    Back Pain    Hip Pain    Medication Refill         [facility]  Pt complains of pain in his right leg that radiates from the buttock to the foot. MRI lumbar with degenerative changes and disc bulging at L5-S1 - right paracentral disc protrusion at L5-S1 resulting in mild bilateral foraminal narrowing and narrowing of the right lateral recess. Disc material abuts the descending right S1 nerve root. He has seen a surgeon who recommended LESI and if pain was not relieved would consider surgery - L5-S1 PLIF. Pt had caudal epidural steroid injection x2 and reports no relief of radicular pain. He continues physical therapy without much benefit. He tried gabapentin for the pain but states he d/c due to side effects. Started on Lyrica and titrated up to 100 mg BID last visit. Denies side effects. He states he has stopped taking the Lyrica because he did not feel it was helping at this time. Discussed options and he would like a referral to another surgeon for a second opinion. Hip Pain   The incident occurred more than 1 week ago. There was no injury mechanism. The pain is present in the right leg. The quality of the pain is described as aching. The pain is at a severity of 1/10. The pain is mild. Associated symptoms include numbness. Pertinent negatives include no inability to bear weight, loss of motion, loss of sensation, muscle weakness or tingling. He reports no foreign bodies present. The symptoms are aggravated by movement, palpation and weight bearing. He has tried acetaminophen, elevation, heat, ice, immobilization, non-weight bearing, NSAIDs and rest for the symptoms. The treatment provided no relief. Patient denies any new neurological symptoms. No bowel or bladder incontinence, no weakness, and no falling.       Past Medical History:   Diagnosis Date    Erectile dysfunction 5/4/2018    Lumbar radiculopathy 7/28/2022       Past Surgical History: Procedure Laterality Date    LIPOMA RESECTION  2005    PAIN MANAGEMENT PROCEDURE      WISDOM TOOTH EXTRACTION Bilateral        No Known Allergies      Current Outpatient Medications:     pregabalin (LYRICA) 100 MG capsule, Take 1 capsule by mouth 2 times daily for 60 days. , Disp: 60 capsule, Rfl: 1    ibuprofen (ADVIL;MOTRIN) 400 MG tablet, Take 400 mg by mouth every 6 hours as needed for Pain, Disp: , Rfl:     Family History   Problem Relation Age of Onset    Hypertension Father     High Cholesterol Father     Liver Cancer Maternal Grandmother     Cancer Paternal Grandmother        Social History     Socioeconomic History    Marital status:      Spouse name: Not on file    Number of children: Not on file    Years of education: Not on file    Highest education level: Not on file   Occupational History    Not on file   Tobacco Use    Smoking status: Never    Smokeless tobacco: Never   Vaping Use    Vaping Use: Never used   Substance and Sexual Activity    Alcohol use: Yes     Comment: social    Drug use: No    Sexual activity: Yes     Partners: Female   Other Topics Concern    Not on file   Social History Narrative    Not on file     Social Determinants of Health     Financial Resource Strain: Not on file   Food Insecurity: Not on file   Transportation Needs: Not on file   Physical Activity: Sufficiently Active    Days of Exercise per Week: 5 days    Minutes of Exercise per Session: 30 min   Stress: Not on file   Social Connections: Not on file   Intimate Partner Violence: Not At Risk    Fear of Current or Ex-Partner: No    Emotionally Abused: No    Physically Abused: No    Sexually Abused: No   Housing Stability: Not on file       Review of Systems:  Review of Systems   Constitutional: Negative for chills and fever. Cardiovascular:  Negative for chest pain and palpitations. Respiratory:  Negative for cough and shortness of breath. Musculoskeletal:  Positive for back pain.    Gastrointestinal: Negative for constipation. Neurological:  Positive for numbness. Negative for disturbances in coordination, loss of balance and tingling. Physical Exam:  There were no vitals taken for this visit. Physical Exam  HENT:      Head: Normocephalic. Pulmonary:      Effort: Pulmonary effort is normal.   Neurological:      Mental Status: He is alert. Psychiatric:         Mood and Affect: Mood normal.         Behavior: Behavior normal.       Record/Diagnostics Review:    FINDINGS:   BONES/ALIGNMENT: Vertebral body heights are maintained. There is   age-appropriate bone marrow signal.  There is degenerative endplate change at   F4-A9. There is multilevel degenerative disc disease with loss of disc   signal.  There is minimal disc space narrowing at L5-S1. There is no   spondylolisthesis. SPINAL CORD: Conus is normal in caliber and signal and terminates at the L1   level. The cauda equina is unremarkable. SOFT TISSUES: The posterior paraspinal soft tissues are unremarkable. The   visualized abdominal structures are unremarkable. L1-L2: There is no significant disc herniation, spinal canal stenosis or   neural foraminal narrowing. L2-L3: There is a circumferential disc bulge. There is no canal stenosis or   foraminal narrowing. L3-L4: There is a circumferential disc bulge. There is no canal stenosis or   foraminal narrowing. L4-L5: There is a circumferential disc bulge with a superimposed midline disc   protrusion. There is no canal stenosis or foraminal narrowing. L5-S1: There is a circumferential disc bulge with a midline and right   paracentral disc protrusion. There is no canal stenosis. There is mild   bilateral foraminal narrowing. There is narrowing of the right lateral   recess and disc material abuts the descending right S1 nerve root.            Impression   Disc bulge with midline and right paracentral disc protrusion at L5-S1   resulting in mild bilateral foraminal narrowing and narrowing of the right   lateral recess. Disc material abuts the descending right S1 nerve root. Assessment:  Problem List Items Addressed This Visit       Lumbar radiculopathy    Lumbosacral neuritis          Treatment Plan:  Patient continues to have intermittent right leg pain that starts in the right buttock and radiates to the foot. Denies axial back pain  He has failed conservative treatments including injections, Lyrica, gabapentin, PT  D/C Suzanne  Has seen a surgeon who recommended surgery if conservative treatments fail  Pt requests a referral to another surgeon for a second opinion - he will call the office with the name of the surgeon he wishes to see  Follow up in 8 weeks    I have reviewed the chief complaint and history of present illness (including ROS and PFSH) and vital documentation by my staff and I agree with their documentation and have added where applicable. Rena Lang, was evaluated through a synchronous (real-time) audio-video encounter. The patient (or guardian if applicable) is aware that this is a billable service, which includes applicable co-pays. This Virtual Visit was conducted with patient's (and/or legal guardian's) consent. The visit was conducted pursuant to the emergency declaration under the [facility], [address] [place] [address] waiver authority and the [employer] and [employer] General Act. Patient identification was verified, and a caregiver was present when appropriate. The patient was located at Home: [address]. Provider was located at  ([place] [id number]): [address] [facility],  [address] [facility]. Total time spent for this encounter:  20 minutes - video visit    --ROXY Hensley CNP on 11/18/2022 at 8:25 AM    An electronic signature was used to authenticate this note.

## 2022-11-21 ENCOUNTER — OFFICE VISIT (OUTPATIENT)
Dept: PRIMARY CARE CLINIC | Age: 52
End: 2022-11-21
Payer: OTHER GOVERNMENT

## 2022-11-21 VITALS
OXYGEN SATURATION: 98 % | BODY MASS INDEX: 28.55 KG/M2 | WEIGHT: 199 LBS | SYSTOLIC BLOOD PRESSURE: 124 MMHG | DIASTOLIC BLOOD PRESSURE: 86 MMHG | HEART RATE: 74 BPM

## 2022-11-21 DIAGNOSIS — Z11.59 NEED FOR HEPATITIS C SCREENING TEST: ICD-10-CM

## 2022-11-21 DIAGNOSIS — Z11.4 ENCOUNTER FOR SCREENING FOR HIV: ICD-10-CM

## 2022-11-21 DIAGNOSIS — Z13.220 ENCOUNTER FOR LIPID SCREENING FOR CARDIOVASCULAR DISEASE: ICD-10-CM

## 2022-11-21 DIAGNOSIS — Z00.00 HEALTH CARE MAINTENANCE: ICD-10-CM

## 2022-11-21 DIAGNOSIS — N52.9 ERECTILE DYSFUNCTION, UNSPECIFIED ERECTILE DYSFUNCTION TYPE: ICD-10-CM

## 2022-11-21 DIAGNOSIS — Z00.00 HEALTHY ADULT ON ROUTINE PHYSICAL EXAMINATION: ICD-10-CM

## 2022-11-21 DIAGNOSIS — Z12.11 SCREEN FOR COLON CANCER: Primary | ICD-10-CM

## 2022-11-21 DIAGNOSIS — Z23 NEED FOR VACCINATION: ICD-10-CM

## 2022-11-21 DIAGNOSIS — Z12.5 SCREENING PSA (PROSTATE SPECIFIC ANTIGEN): ICD-10-CM

## 2022-11-21 DIAGNOSIS — Z13.6 ENCOUNTER FOR LIPID SCREENING FOR CARDIOVASCULAR DISEASE: ICD-10-CM

## 2022-11-21 PROCEDURE — 90471 IMMUNIZATION ADMIN: CPT | Performed by: NURSE PRACTITIONER

## 2022-11-21 PROCEDURE — 99396 PREV VISIT EST AGE 40-64: CPT | Performed by: NURSE PRACTITIONER

## 2022-11-21 PROCEDURE — 90674 CCIIV4 VAC NO PRSV 0.5 ML IM: CPT | Performed by: NURSE PRACTITIONER

## 2022-11-21 RX ORDER — SILDENAFIL CITRATE 100 MG
100 TABLET ORAL PRN
Qty: 18 TABLET | Refills: 2 | Status: SHIPPED | OUTPATIENT
Start: 2022-11-21 | End: 2022-11-30

## 2022-11-21 SDOH — ECONOMIC STABILITY: FOOD INSECURITY: WITHIN THE PAST 12 MONTHS, YOU WORRIED THAT YOUR FOOD WOULD RUN OUT BEFORE YOU GOT MONEY TO BUY MORE.: NEVER TRUE

## 2022-11-21 SDOH — ECONOMIC STABILITY: FOOD INSECURITY: WITHIN THE PAST 12 MONTHS, THE FOOD YOU BOUGHT JUST DIDN'T LAST AND YOU DIDN'T HAVE MONEY TO GET MORE.: NEVER TRUE

## 2022-11-21 ASSESSMENT — ENCOUNTER SYMPTOMS
SHORTNESS OF BREATH: 0
ABDOMINAL PAIN: 0
NAUSEA: 0
CONSTIPATION: 0
EYE DISCHARGE: 0
BACK PAIN: 0
EYE REDNESS: 0
RHINORRHEA: 0
VOMITING: 0
COUGH: 0
SORE THROAT: 0
DIARRHEA: 0
WHEEZING: 0

## 2022-11-21 ASSESSMENT — PATIENT HEALTH QUESTIONNAIRE - PHQ9
2. FEELING DOWN, DEPRESSED OR HOPELESS: 0
SUM OF ALL RESPONSES TO PHQ QUESTIONS 1-9: 0
SUM OF ALL RESPONSES TO PHQ9 QUESTIONS 1 & 2: 0
1. LITTLE INTEREST OR PLEASURE IN DOING THINGS: 0
SUM OF ALL RESPONSES TO PHQ QUESTIONS 1-9: 0

## 2022-11-21 ASSESSMENT — SOCIAL DETERMINANTS OF HEALTH (SDOH): HOW HARD IS IT FOR YOU TO PAY FOR THE VERY BASICS LIKE FOOD, HOUSING, MEDICAL CARE, AND HEATING?: NOT HARD AT ALL

## 2022-11-21 NOTE — PROGRESS NOTES
[address] [address]  [address] [address]  Dept: [phone]    Rena Lang is a 45 y.o. male Established patient, who presents today for his medical conditions/complaints as noted below. Chief Complaint   Patient presents with    Annual Exam       HPI:     HPI  He had injections in lower back due to pain caused by bulging disc. Injections were not effective and he is considering seeing a surgeon. Pain management is asking for lab work. He request refill on Viagra. Viagra is working [employer]. No side effects. No other concerns expressed today. He is due to screening lab work. Reviewed prior notes: Orthopedics and pain management  Reviewed previous:  Imaging    No results found for: LDLCHOLESTEROL, LDLCALC    (goal LDL is <100)   No results found for: AST, ALT, BUN, CR, LABA1C, TSH  BP Readings from Last 3 Encounters:   11/21/22 124/86   07/14/22 127/89   06/23/22 (!) 140/76          (goal 120/80)    Past Medical History:   Diagnosis Date    Erectile dysfunction 5/4/2018    Lumbar radiculopathy 7/28/2022      Past Surgical History:   Procedure Laterality Date    LIPOMA RESECTION  2005    PAIN MANAGEMENT PROCEDURE      WISDOM TOOTH EXTRACTION Bilateral        Family History   Problem Relation Age of Onset    Hypertension Father     High Cholesterol Father     Liver Cancer Maternal Grandmother     Cancer Paternal Grandmother        Social History     Tobacco Use    Smoking status: Never    Smokeless tobacco: Never   Substance Use Topics    Alcohol use: Yes     Comment: social      Current Outpatient Medications   Medication Sig Dispense Refill    VIAGRA 100 MG tablet Take 1 tablet by mouth as needed for Erectile Dysfunction 18 tablet 2    ibuprofen (ADVIL;MOTRIN) 400 MG tablet Take 400 mg by mouth every 6 hours as needed for Pain       No current facility-administered medications for this visit.      No Known Allergies    Health Maintenance   Topic Date Due    HIV screen  Never done    Hepatitis C screen  Never done    DTaP/Tdap/Td vaccine (1 - Tdap) Never done    Diabetes screen  Never done    Lipids  Never done    Colorectal Cancer Screen  Never done    Shingles vaccine (1 of 2) Never done    COVID-19 Vaccine (4 - Booster for Pfizer series) 03/18/2022    Depression Screen  10/07/2022    Flu vaccine (1) 11/21/2023 (Originally 8/1/2022)    Hepatitis A vaccine  Aged Out    Hib vaccine  Aged Out    Meningococcal (ACWY) vaccine  Aged Out    Pneumococcal 0-64 years Vaccine  Aged Out       Subjective:      Review of Systems   Constitutional:  Negative for chills and fever. HENT:  Negative for congestion, rhinorrhea and sore throat. Eyes:  Negative for discharge and redness. Respiratory:  Negative for cough, shortness of breath and wheezing. Cardiovascular:  Negative for chest pain and palpitations. Gastrointestinal:  Negative for abdominal pain, constipation, diarrhea, nausea and vomiting. Genitourinary:  Negative for dysuria and frequency. Musculoskeletal:  Negative for arthralgias, back pain and myalgias. Pain is in right hip and then numbness from right knee to toes - needles and pin   Neurological:  Negative for dizziness, light-headedness and headaches. Psychiatric/Behavioral:  Negative for dysphoric mood and sleep disturbance. The patient is not nervous/anxious. Objective:     /86   Pulse 74   Wt 199 lb (90.3 kg)   SpO2 98%   BMI 28.55 kg/m²   Physical Exam  Vitals and nursing note reviewed. Constitutional:       General: He is not in acute distress. Appearance: He is well-developed. He is not ill-appearing. HENT:      Head: Normocephalic and atraumatic. Right Ear: External ear normal.      Left Ear: External ear normal.   Eyes:      General: No scleral icterus. Right eye: No discharge. Left eye: No discharge. Conjunctiva/sclera: Conjunctivae normal.   Neck:      Thyroid: No thyromegaly. Trachea: No tracheal deviation. Cardiovascular:      Rate and Rhythm: Normal rate and regular rhythm. Heart sounds: Normal heart sounds. Comments: No carotid bruit  Pulmonary:      Effort: Pulmonary effort is normal. No respiratory distress. Breath sounds: Normal breath sounds. No wheezing. Abdominal:      General: Bowel sounds are normal.      Palpations: Abdomen is soft. Musculoskeletal:      Right lower leg: No edema. Left lower leg: No edema. Lymphadenopathy:      Cervical: No cervical adenopathy. Skin:     General: Skin is warm. Findings: No rash. Neurological:      Mental Status: He is alert and oriented to person, place, and time. Psychiatric:         Mood and Affect: Mood normal.         Behavior: Behavior normal.         Thought Content: Thought content normal.       Assessment/Plan:   1. Screen for colon cancer  -     Cristin Cordero MD, Gastroenterology, [place]  2. Healthy adult on routine physical examination  -     CBC with Auto Differential; Future  -     Comprehensive Metabolic Panel, Fasting; Future  3. Health care maintenance  -     CBC with Auto Differential; Future  -     Comprehensive Metabolic Panel, Fasting; Future  -     Hepatitis C Antibody; Future  -     HIV Screen; Future  4. Screening PSA (prostate specific antigen)  -     PSA Screening; Future  5. Encounter for lipid screening for cardiovascular disease  -     Lipid, Fasting; Future  6. Encounter for screening for HIV  -     HIV Screen; Future  7. Need for hepatitis C screening test  -     Hepatitis C Antibody; Future  8. Need for vaccination  -     Influenza, FLUCELVAX, (age 10 mo+), IM, Preservative Free, 0.5 mL  -     Zoster, SHINGRIX, (18 yrs +), IM  9. Erectile dysfunction, unspecified erectile dysfunction type  -     VIAGRA 100 MG tablet; Take 1 tablet by mouth as needed for Erectile Dysfunction, Disp-18 tablet, R-2, DAWNormal     1.   Referral to Dr. Sherri Hutchison for screening colonoscopy  2. Flu vaccine given today. 3.  First Shingrix vaccine given today. 4.  Complete lab work  5. Viagra refilled. Return in about 1 year (around 11/21/2023) for physical.  Data Unavailable     Orders Placed This Encounter   Procedures    Influenza, FLUCELVAX, (age 10 mo+), IM, Preservative Free, 0.5 mL    Zoster, SHINGRIX, (18 yrs +), IM    Lipid, Fasting     Standing Status:   Future     Standing Expiration Date:   11/21/2023    CBC with Auto Differential     Standing Status:   Future     Standing Expiration Date:   11/21/2023    PSA Screening     Standing Status:   Future     Standing Expiration Date:   11/21/2023    Comprehensive Metabolic Panel, Fasting     Standing Status:   Future     Standing Expiration Date:   11/21/2023    Hepatitis C Antibody     Standing Status:   Future     Standing Expiration Date:   11/21/2023    HIV Screen     Standing Status:   Future     Standing Expiration Date:   11/21/2023    Mali Vitale MD, Gastroenterology, [place]     Referral Priority:   Routine     Referral Type:   Eval and Treat     Referral Reason:   Specialty Services Required     Referred to Provider:   Prince Chau MD     Requested Specialty:   Gastroenterology     Number of Visits Requested:   1     Orders Placed This Encounter   Medications    VIAGRA 100 MG tablet     Sig: Take 1 tablet by mouth as needed for Erectile Dysfunction     Dispense:  18 tablet     Refill:  2       Patient given educational materials - see patient instructions. Discussed use, benefit, and side effects of prescribed medications. All patient questions answered. Pt voiced understanding. Reviewed health maintenance. Instructed to continue current medications, diet and exercise. Patient agreed with treatment plan. Follow up as directed.      Electronically signed by ROXY Duran CNP on 11/21/2022 at 1:34 PM

## 2022-11-23 ENCOUNTER — TELEPHONE (OUTPATIENT)
Dept: GASTROENTEROLOGY | Age: 52
End: 2022-11-23

## 2022-11-23 NOTE — TELEPHONE ENCOUNTER
Writer rec'd colon referral from PCP, writer notes pt is not est with any provider in this practice. 1st attempt; called and LVM for patient regarding colon screen referral.    2nd attempt; mailed colon screen letter to patient's home address.

## 2022-11-28 NOTE — THERAPY DISCHARGE
[] [facility]) @ [facility]  [address] [facility] [address] , [address] [place] [facility]  Phone [phone]  Fax [phone]    Physical Therapy Discharge Note    Date: 2022      Patient: Sabrina Ann  : 1970  MRN: [id number]    Physician: Patricia Schilling MD                     Insurance: Teresita Sinclair -- based in [place], $50 copay    Medical Diagnosis:       M51.36 (ICD-10-CM) - DDD (degenerative disc disease), lumbar   M54.16 (ICD-10-CM) - Lumbar radiculopathy   M48.061 (ICD-10-CM) - Spinal stenosis, lumbar region, without neurogenic claudication   M43.10 (ICD-10-CM) - Acquired spondylolisthesis                 Rehab Codes: R54.4 chronic low back pain , M25.60 stiffness   Onset Date: 2020                   Next 's appt: 22  Visit# / total visits: 4/10                    Cancels/No Shows: 0/0  Date of initial visit: 22                   Pt was last seen 22. He was to have injections and follow up after if needing PT. Due to no follow up and extended time since last visit, will close this episode of care. Treatment Included:     [x] Therapeutic Exercise   81775  [] Iontophoresis: 4 mg/mL Dexamethasone Sodium Phosphate  mAmin  32508   [] Therapeutic Activity  42835 [] Vasopneumatic cold with compression  46329    [] Gait Training   78819 [] Ultrasound   69829   [x] Neuromuscular Re-education  88245 [] Electrical Stimulation Unattended  26886   [x] Manual Therapy  96310 [] Electrical Stimulation Attended  87660   [x] Instruction in HEP  [x] Lumbar/Cervical Traction  16498   [] Aquatic Therapy   07894 [x] Cold/hotpack    [] Massage   24778      [] Dry Needling, 1 or 2 muscles  57981   [] Biofeedback, first 15 minutes   39335  [] Biofeedback, additional 15 minutes   52059 [] Dry Needling, 3 or more muscles  70219                If you have any questions or concerns regarding this patient's care, please contact us.    Thank you for your referral.      Electronically signed by: Ronald Fofana PT

## 2022-11-30 ENCOUNTER — HOSPITAL ENCOUNTER (OUTPATIENT)
Age: 52
Setting detail: SPECIMEN
Discharge: HOME | End: 2022-11-30

## 2022-11-30 DIAGNOSIS — Z00.00 HEALTHY ADULT ON ROUTINE PHYSICAL EXAMINATION: ICD-10-CM

## 2022-11-30 DIAGNOSIS — Z00.00 HEALTH CARE MAINTENANCE: ICD-10-CM

## 2022-11-30 DIAGNOSIS — Z13.6 ENCOUNTER FOR LIPID SCREENING FOR CARDIOVASCULAR DISEASE: ICD-10-CM

## 2022-11-30 DIAGNOSIS — Z11.59 NEED FOR HEPATITIS C SCREENING TEST: ICD-10-CM

## 2022-11-30 DIAGNOSIS — Z11.4 ENCOUNTER FOR SCREENING FOR HIV: ICD-10-CM

## 2022-11-30 DIAGNOSIS — Z12.5 SCREENING PSA (PROSTATE SPECIFIC ANTIGEN): ICD-10-CM

## 2022-11-30 DIAGNOSIS — Z13.220 ENCOUNTER FOR LIPID SCREENING FOR CARDIOVASCULAR DISEASE: ICD-10-CM

## 2022-11-30 LAB
ABSOLUTE EOS #: 0.14 K/UL (ref 0–0.44)
ABSOLUTE IMMATURE GRANULOCYTE: <0.03 K/UL (ref 0–0.3)
ABSOLUTE LYMPH #: 1.69 K/UL (ref 1.1–3.7)
ABSOLUTE MONO #: 0.52 K/UL (ref 0.1–1.2)
BASOPHILS # BLD: 1 % (ref 0–2)
BASOPHILS ABSOLUTE: 0.05 K/UL (ref 0–0.2)
EOSINOPHILS RELATIVE PERCENT: 2 % (ref 1–4)
HCT VFR BLD CALC: 50.8 % (ref 40.7–50.3)
HEMOGLOBIN: 16.1 G/DL (ref 13–17)
IMMATURE GRANULOCYTES: 0 %
LYMPHOCYTES # BLD: 24 % (ref 24–43)
MCH RBC QN AUTO: 28.4 PG (ref 25.2–33.5)
MCHC RBC AUTO-ENTMCNC: 31.7 G/DL (ref 28.4–34.8)
MCV RBC AUTO: 89.8 FL (ref 82.6–102.9)
MONOCYTES # BLD: 7 % (ref 3–12)
NRBC AUTOMATED: 0 PER 100 WBC
PDW BLD-RTO: 12.4 % (ref 11.8–14.4)
PLATELET # BLD: 270 K/UL (ref 138–453)
PMV BLD AUTO: 11.4 FL (ref 8.1–13.5)
RBC # BLD: 5.66 M/UL (ref 4.21–5.77)
SEG NEUTROPHILS: 66 % (ref 36–65)
SEGMENTED NEUTROPHILS ABSOLUTE COUNT: 4.76 K/UL (ref 1.5–8.1)
WBC # BLD: 7.2 K/UL (ref 3.5–11.3)

## 2022-12-01 LAB
ALBUMIN SERPL-MCNC: 4.6 G/DL (ref 3.5–5.2)
ALBUMIN/GLOBULIN RATIO: 1.6 (ref 1–2.5)
ALP BLD-CCNC: 61 U/L (ref 40–129)
ALT SERPL-CCNC: 34 U/L (ref 5–41)
ANION GAP SERPL CALCULATED.3IONS-SCNC: 14 MMOL/L (ref 9–17)
AST SERPL-CCNC: 24 U/L
BILIRUB SERPL-MCNC: 0.4 MG/DL (ref 0.3–1.2)
BUN BLDV-MCNC: 10 MG/DL (ref 6–20)
CALCIUM SERPL-MCNC: 9.3 MG/DL (ref 8.6–10.4)
CHLORIDE BLD-SCNC: 98 MMOL/L (ref 98–107)
CHOLESTEROL, FASTING: 202 MG/DL
CHOLESTEROL/HDL RATIO: 4
CO2: 25 MMOL/L (ref 20–31)
CREAT SERPL-MCNC: 0.94 MG/DL (ref 0.7–1.2)
GFR SERPL CREATININE-BSD FRML MDRD: >60 ML/MIN/1.73M2
GLUCOSE FASTING: 70 MG/DL (ref 70–99)
HDLC SERPL-MCNC: 50 MG/DL
HEPATITIS C ANTIBODY: NONREACTIVE
HIV AG/AB: NONREACTIVE
LDL CHOLESTEROL: 142 MG/DL (ref 0–130)
POTASSIUM SERPL-SCNC: 4.5 MMOL/L (ref 3.7–5.3)
PROSTATE SPECIFIC ANTIGEN: 0.25 NG/ML
SODIUM BLD-SCNC: 137 MMOL/L (ref 135–144)
TOTAL PROTEIN: 7.5 G/DL (ref 6.4–8.3)
TRIGLYCERIDE, FASTING: 50 MG/DL

## 2023-02-15 DIAGNOSIS — N52.9 ERECTILE DYSFUNCTION, UNSPECIFIED ERECTILE DYSFUNCTION TYPE: ICD-10-CM

## 2023-02-15 RX ORDER — SILDENAFIL 100 MG/1
TABLET, FILM COATED ORAL
Qty: 30 TABLET | Refills: 3 | Status: SHIPPED | OUTPATIENT
Start: 2023-02-15

## 2023-07-12 ENCOUNTER — OFFICE VISIT (OUTPATIENT)
Dept: PRIMARY CARE CLINIC | Age: 53
End: 2023-07-12
Payer: OTHER GOVERNMENT

## 2023-07-12 VITALS
OXYGEN SATURATION: 97 % | WEIGHT: 198.7 LBS | SYSTOLIC BLOOD PRESSURE: 108 MMHG | HEART RATE: 80 BPM | DIASTOLIC BLOOD PRESSURE: 72 MMHG | BODY MASS INDEX: 28.51 KG/M2

## 2023-07-12 DIAGNOSIS — S46.311A STRAIN OF RIGHT TRICEPS, INITIAL ENCOUNTER: ICD-10-CM

## 2023-07-12 DIAGNOSIS — K43.9 VENTRAL HERNIA WITHOUT OBSTRUCTION OR GANGRENE: Primary | ICD-10-CM

## 2023-07-12 PROCEDURE — 99213 OFFICE O/P EST LOW 20 MIN: CPT | Performed by: NURSE PRACTITIONER

## 2023-07-12 RX ORDER — CEPHALEXIN 500 MG/1
CAPSULE ORAL
COMMUNITY
Start: 2023-07-11

## 2023-07-12 SDOH — ECONOMIC STABILITY: HOUSING INSECURITY
IN THE LAST 12 MONTHS, WAS THERE A TIME WHEN YOU DID NOT HAVE A STEADY PLACE TO SLEEP OR SLEPT IN A SHELTER (INCLUDING NOW)?: NO

## 2023-07-12 SDOH — ECONOMIC STABILITY: FOOD INSECURITY: WITHIN THE PAST 12 MONTHS, YOU WORRIED THAT YOUR FOOD WOULD RUN OUT BEFORE YOU GOT MONEY TO BUY MORE.: NEVER TRUE

## 2023-07-12 SDOH — ECONOMIC STABILITY: FOOD INSECURITY: WITHIN THE PAST 12 MONTHS, THE FOOD YOU BOUGHT JUST DIDN'T LAST AND YOU DIDN'T HAVE MONEY TO GET MORE.: NEVER TRUE

## 2023-07-12 SDOH — ECONOMIC STABILITY: INCOME INSECURITY: HOW HARD IS IT FOR YOU TO PAY FOR THE VERY BASICS LIKE FOOD, HOUSING, MEDICAL CARE, AND HEATING?: NOT HARD AT ALL

## 2023-07-12 ASSESSMENT — PATIENT HEALTH QUESTIONNAIRE - PHQ9
SUM OF ALL RESPONSES TO PHQ9 QUESTIONS 1 & 2: 0
SUM OF ALL RESPONSES TO PHQ QUESTIONS 1-9: 0
2. FEELING DOWN, DEPRESSED OR HOPELESS: 0
1. LITTLE INTEREST OR PLEASURE IN DOING THINGS: 0
SUM OF ALL RESPONSES TO PHQ QUESTIONS 1-9: 0

## 2023-07-12 ASSESSMENT — ENCOUNTER SYMPTOMS
NAUSEA: 0
BACK PAIN: 1
SHORTNESS OF BREATH: 0
DIARRHEA: 0
COUGH: 0
CONSTIPATION: 0

## 2023-07-12 NOTE — PATIENT INSTRUCTIONS
Referral to Dr. Leigh Lobo ice to right arm tenderness 2-3x/day for 15 minutes at a time. May use ibuprofen as needed - it will help with inflammation.

## 2023-07-12 NOTE — PROGRESS NOTES
[address] [facility] PRIMARY CARE  [facility]  [address] [address]  Dept: [phone]    Sally Holley is a 47 y.o. male Established patient, who presents today for his medical conditions/complaints as noted below. Chief Complaint   Patient presents with    Mass     Abdomen when laying down       HPI:     Mass  Pertinent negatives include no chest pain, chills, coughing, fatigue, fever or nausea. When he does a sit up or leans backwards he notices a protrusion from his stomach. No pain in abdomen. No change in bowels and no acid reflux. Lower back and down right leg is still present. He just got an inversion table. He takes ibuprofen for it when it acts up. He has lost ROM in right arm. He has difficulty throwing anything. No history or previous injury. No trauma. He has noticed it a few weeks. He does not due any repetitive motion.     Reviewed prior notes: None   Reviewed previous:        LDL Cholesterol (mg/dL)   Date Value   11/30/2022 142 (H)       (goal LDL is <100)   AST (U/L)   Date Value   11/30/2022 24     ALT (U/L)   Date Value   11/30/2022 34     BUN (mg/dL)   Date Value   11/30/2022 10     BP Readings from Last 3 Encounters:   07/12/23 108/72   11/21/22 124/86   07/14/22 127/89          (goal 120/80)    Past Medical History:   Diagnosis Date    Erectile dysfunction 5/4/2018    Lumbar radiculopathy 7/28/2022      Past Surgical History:   Procedure Laterality Date    LIPOMA RESECTION  2005    PAIN MANAGEMENT PROCEDURE      WISDOM TOOTH EXTRACTION Bilateral        Family History   Problem Relation Age of Onset    Hypertension Father     High Cholesterol Father     Liver Cancer Maternal Grandmother     Cancer Paternal Grandmother        Social History     Tobacco Use    Smoking status: Never    Smokeless tobacco: Never   Substance Use Topics    Alcohol use: Yes     Comment: social      Current Outpatient Medications   Medication Sig Dispense Refill

## 2023-07-18 ENCOUNTER — OFFICE VISIT (OUTPATIENT)
Dept: SURGERY | Age: 53
End: 2023-07-18
Payer: OTHER GOVERNMENT

## 2023-07-18 VITALS
WEIGHT: 191 LBS | BODY MASS INDEX: 27.35 KG/M2 | DIASTOLIC BLOOD PRESSURE: 81 MMHG | OXYGEN SATURATION: 100 % | HEIGHT: 70 IN | SYSTOLIC BLOOD PRESSURE: 128 MMHG | HEART RATE: 76 BPM | RESPIRATION RATE: 16 BRPM

## 2023-07-18 DIAGNOSIS — M62.08 DIASTASIS RECTI: Primary | ICD-10-CM

## 2023-07-18 PROCEDURE — 99203 OFFICE O/P NEW LOW 30 MIN: CPT | Performed by: SURGERY

## 2023-07-19 NOTE — PROGRESS NOTES
[address] [facility]  Clinic Evaluation  Milagro Tamayo DO    Pt Name: Hood Linares  MRN: [id number]  YOB: 1970  Date of evaluation: 7/18/2023  Primary Care Physician: ROXY Randle CNP    Chief Complaint: abdominal wall bulge      SUBJECTIVE:    History of Present Illness: This is a 47 y.o.  male who presents for evaluation for the above, noticed several weeks ago, denies pain, no prior abdominal surgeries. Chart review performed to add information to the HPI: Yes    Past Medical History   has a past medical history of Erectile dysfunction and Lumbar radiculopathy. Past Surgical History   has a past surgical history that includes [place] tooth extraction (Bilateral); lipoma resection (2005); and Pain management procedure. Family History  family history includes Cancer in his paternal grandmother; High Cholesterol in his father; Hypertension in his father; Sheliah Alken in his maternal grandmother. Social History  Tobacco use:  reports that he has never smoked. He has never used smokeless tobacco.  Alcohol use:  reports current alcohol use. Drug use:  reports no history of drug use. Medications  Current Medications:   Current Outpatient Medications   Medication Sig Dispense Refill    cephALEXin (KEFLEX) 500 MG capsule       sildenafil (VIAGRA) 100 MG tablet TAKE 1 TABLET AS NEEDED FOR ERECTILE DYSFUNCTION 30 tablet 3    ibuprofen (ADVIL;MOTRIN) 400 MG tablet Take 1 tablet by mouth every 6 hours as needed for Pain       No current facility-administered medications for this visit. Home Medications:   Prior to Admission medications    Medication Sig Start Date End Date Taking?  Authorizing Provider   cephALEXin (KEFLEX) 500 MG capsule  7/11/23   Historical Provider, MD   sildenafil (VIAGRA) 100 MG tablet TAKE 1 TABLET AS NEEDED FOR ERECTILE DYSFUNCTION 2/15/23   ROXY Randle CNP   ibuprofen (ADVIL;MOTRIN) 400 MG tablet Take 1 tablet by mouth every 6 hours as

## 2024-01-22 ENCOUNTER — HOSPITAL ENCOUNTER (OUTPATIENT)
Age: 54
Setting detail: SPECIMEN
Discharge: HOME | End: 2024-01-22

## 2024-01-24 LAB — SURGICAL PATHOLOGY REPORT: NORMAL

## 2024-02-12 DIAGNOSIS — N52.9 ERECTILE DYSFUNCTION, UNSPECIFIED ERECTILE DYSFUNCTION TYPE: ICD-10-CM

## 2024-02-12 RX ORDER — SILDENAFIL 100 MG/1
TABLET, FILM COATED ORAL
Qty: 30 TABLET | Refills: 3 | Status: SHIPPED | OUTPATIENT
Start: 2024-02-12

## 2024-07-15 DIAGNOSIS — M54.50 CHRONIC LOW BACK PAIN, UNSPECIFIED BACK PAIN LATERALITY, UNSPECIFIED WHETHER SCIATICA PRESENT: ICD-10-CM

## 2024-07-15 DIAGNOSIS — M51.26 PROTRUSION OF LUMBAR INTERVERTEBRAL DISC: Primary | ICD-10-CM

## 2024-07-15 DIAGNOSIS — G89.29 CHRONIC LOW BACK PAIN, UNSPECIFIED BACK PAIN LATERALITY, UNSPECIFIED WHETHER SCIATICA PRESENT: ICD-10-CM

## 2024-07-15 NOTE — PROGRESS NOTES
I ordered the MRI of your lower back.  Please let us know if you would like it mailed to you or fax someplace.  I do not know if insurance will approve it since I have not seen you in over a year.  And have no documentation of recent lower back pain.  Insurance may require an appointment for more documentation.

## 2024-07-19 ENCOUNTER — TELEPHONE (OUTPATIENT)
Dept: PRIMARY CARE CLINIC | Age: 54
End: 2024-07-19

## 2024-07-19 NOTE — TELEPHONE ENCOUNTER
Patient  returned call and clarified that it will be done at [facility].   pt arrived c/o chronic lower back pain from accident a few years ago. pt states she [place] but the pain was not resolved. as per pt family, pt scheduled for surgery next week but could not stand the pain. pt endorsing sever lower back pain.

## 2024-07-19 NOTE — TELEPHONE ENCOUNTER
I received a call asking for patients information and he stated he was from [facility]. I stated the information should be on the order or if he reached out to the patient. He stated it wasn't on there and he wasn't authorized to call the patient. I didn't give out any information but I called the patient to see where he was having his imaging done. Please verify.

## 2024-07-23 ENCOUNTER — HOSPITAL ENCOUNTER (OUTPATIENT)
Dept: MRI IMAGING | Facility: CLINIC | Age: 54
Discharge: HOME | End: 2024-07-25
Payer: OTHER GOVERNMENT

## 2024-07-23 DIAGNOSIS — M54.50 CHRONIC LOW BACK PAIN, UNSPECIFIED BACK PAIN LATERALITY, UNSPECIFIED WHETHER SCIATICA PRESENT: ICD-10-CM

## 2024-07-23 DIAGNOSIS — G89.29 CHRONIC LOW BACK PAIN, UNSPECIFIED BACK PAIN LATERALITY, UNSPECIFIED WHETHER SCIATICA PRESENT: ICD-10-CM

## 2024-07-23 DIAGNOSIS — M51.26 PROTRUSION OF LUMBAR INTERVERTEBRAL DISC: ICD-10-CM

## 2024-07-23 PROCEDURE — 72148 MRI LUMBAR SPINE W/O DYE: CPT

## 2024-10-02 ENCOUNTER — OFFICE VISIT (OUTPATIENT)
Dept: PRIMARY CARE CLINIC | Age: 54
End: 2024-10-02
Payer: OTHER GOVERNMENT

## 2024-10-02 VITALS
HEIGHT: 70 IN | BODY MASS INDEX: 29.06 KG/M2 | HEART RATE: 77 BPM | SYSTOLIC BLOOD PRESSURE: 122 MMHG | OXYGEN SATURATION: 95 % | DIASTOLIC BLOOD PRESSURE: 76 MMHG | WEIGHT: 203 LBS

## 2024-10-02 DIAGNOSIS — Z12.11 COLON CANCER SCREENING: ICD-10-CM

## 2024-10-02 DIAGNOSIS — M51.26 HERNIATED LUMBAR INTERVERTEBRAL DISC: ICD-10-CM

## 2024-10-02 DIAGNOSIS — Z01.818 PREOPERATIVE CLEARANCE: Primary | ICD-10-CM

## 2024-10-02 PROCEDURE — 99213 OFFICE O/P EST LOW 20 MIN: CPT | Performed by: NURSE PRACTITIONER

## 2024-10-02 SDOH — ECONOMIC STABILITY: FOOD INSECURITY: WITHIN THE PAST 12 MONTHS, YOU WORRIED THAT YOUR FOOD WOULD RUN OUT BEFORE YOU GOT MONEY TO BUY MORE.: NEVER TRUE

## 2024-10-02 SDOH — ECONOMIC STABILITY: FOOD INSECURITY: WITHIN THE PAST 12 MONTHS, THE FOOD YOU BOUGHT JUST DIDN'T LAST AND YOU DIDN'T HAVE MONEY TO GET MORE.: NEVER TRUE

## 2024-10-02 SDOH — ECONOMIC STABILITY: INCOME INSECURITY: HOW HARD IS IT FOR YOU TO PAY FOR THE VERY BASICS LIKE FOOD, HOUSING, MEDICAL CARE, AND HEATING?: NOT HARD AT ALL

## 2024-10-02 ASSESSMENT — PATIENT HEALTH QUESTIONNAIRE - PHQ9
DEPRESSION UNABLE TO ASSESS: FUNCTIONAL CAPACITY MOTIVATION LIMITS ACCURACY
1. LITTLE INTEREST OR PLEASURE IN DOING THINGS: NOT AT ALL
SUM OF ALL RESPONSES TO PHQ QUESTIONS 1-9: 0
2. FEELING DOWN, DEPRESSED OR HOPELESS: NOT AT ALL
SUM OF ALL RESPONSES TO PHQ QUESTIONS 1-9: 0
SUM OF ALL RESPONSES TO PHQ QUESTIONS 1-9: 0
SUM OF ALL RESPONSES TO PHQ9 QUESTIONS 1 & 2: 0
SUM OF ALL RESPONSES TO PHQ QUESTIONS 1-9: 0

## 2024-10-02 ASSESSMENT — ENCOUNTER SYMPTOMS
BACK PAIN: 1
SINUS PRESSURE: 0
SINUS PAIN: 0
COUGH: 0
CONSTIPATION: 0
NAUSEA: 0
DIARRHEA: 0
SHORTNESS OF BREATH: 0

## 2024-10-02 NOTE — PATIENT INSTRUCTIONS
Cleared for surgery with low risk  Labs, EKG and CXR to be completed  Colonoscopy for colon cancer screening

## 2024-10-02 NOTE — PROGRESS NOTES
Movements: Extraocular movements intact.      Conjunctiva/sclera: Conjunctivae normal.      Pupils: Pupils are equal, round, and reactive to light.   Neck:      Thyroid: No thyromegaly.      Trachea: No tracheal deviation.   Cardiovascular:      Rate and Rhythm: Normal rate and regular rhythm.      Heart sounds: Normal heart sounds.   Pulmonary:      Effort: Pulmonary effort is normal. No respiratory distress.      Breath sounds: Normal breath sounds. No wheezing.   Abdominal:      General: Bowel sounds are normal.      Palpations: Abdomen is soft.   Musculoskeletal:      Right lower leg: No edema.      Left lower leg: No edema.   Lymphadenopathy:      Head:      Right side of head: No submental, submandibular or tonsillar adenopathy.      Left side of head: No submental, submandibular or tonsillar adenopathy.      Cervical: No cervical adenopathy.   Skin:     General: Skin is warm.      Findings: No rash.   Neurological:      Mental Status: He is alert and oriented to person, place, and time.   Psychiatric:         Mood and Affect: Mood normal.         Behavior: Behavior normal.         Thought Content: Thought content normal.         Assessment/Plan:   1. Preoperative clearance  2. Herniated lumbar intervertebral disc  3. Colon cancer screening  -     [facility] [place]     Cleared for surgery with low risk  Labs, EKG and CXR to be completed  Colonoscopy for colon cancer screening    Return in about 1 year (around 10/2/2025) for physical .  Data Unavailable     Orders Placed This Encounter   Procedures    [facility] [place]     Referral Priority:   Routine     Referral Type:   Eval and Treat     Referral Reason:   Specialty Services Required     Requested Specialty:   Gastroenterology     Number of Visits Requested:   1     No orders of the defined types were placed in this encounter.      Patient given educational materials - see patient instructions.  Discussed use, benefit, and

## 2024-10-04 RX ORDER — POLYETHYLENE GLYCOL 3350, SODIUM SULFATE ANHYDROUS, SODIUM BICARBONATE, SODIUM CHLORIDE, POTASSIUM CHLORIDE 236; 22.74; 6.74; 5.86; 2.97 G/4L; G/4L; G/4L; G/4L; G/4L
4 POWDER, FOR SOLUTION ORAL ONCE
Qty: 4000 ML | Refills: 0 | Status: SHIPPED | OUTPATIENT
Start: 2024-10-04 | End: 2024-10-04

## 2024-10-04 RX ORDER — BISACODYL 5 MG/1
TABLET, DELAYED RELEASE ORAL
Qty: 4 TABLET | Refills: 0 | Status: SHIPPED | OUTPATIENT
Start: 2024-10-04

## 2024-10-04 NOTE — TELEPHONE ENCOUNTER
Procedure scheduled/Dr Wilson  Procedure: colonoscopy  Dx: colon screening  Date:01/06/25  Time:12:00 pm / arrival 10:00 am  Hospital: [facility] phone call: KARLA  Bowel Prep instructions given: Andrea/ Dulcolax  In office/via phone:  via phone  Clearance needed: GULSHAN

## 2024-12-09 ENCOUNTER — PATIENT MESSAGE (OUTPATIENT)
Dept: GASTROENTEROLOGY | Age: 54
End: 2024-12-09

## 2024-12-12 ENCOUNTER — HOSPITAL ENCOUNTER (OUTPATIENT)
Dept: PREADMISSION TESTING | Age: 54
Discharge: HOME | End: 2024-12-16

## 2024-12-12 VITALS — WEIGHT: 192 LBS | BODY MASS INDEX: 27.49 KG/M2 | HEIGHT: 70 IN

## 2024-12-12 NOTE — PROGRESS NOTES
Pre-op Instructions For Out-Patient Endoscopy Surgery    Medication Instructions:  Please stop herbs and any supplements now (includes vitamins and minerals).    For these medications:  Dulaglutide (Trulicity), Exenatide (Byetta and Bydureon, Liraglutide (Victoza), Lixisenatide (Adlyxin), Semaglutide (Ozempic and Rybelsus), Tirzepatide (Mounjaro)- Stop 1 week prior if taking weekly or 1 day prior if taking every 12 hours or daily.     Please contact your surgeon and prescribing physician for pre-op instructions for any blood thinners. STOP IBUPROFEN AS DIRECTED    If you have inhalers/aerosol treatments at home, please use them the morning of your surgery and bring the inhalers with you to the hospital.    Please take the following medications the morning of your surgery with a sip of water:    None    Surgery Instructions:  After midnight before surgery:  Do not eat or drink anything, including water, mints, gum, and hard candy.  You may brush your teeth without swallowing.  No smoking, chewing tobacco, or street drugs.     ** Please Follow Bowel Prep instructions if given by surgeon's office**    Please shower or bathe before surgery.       Please do not wear any cologne, lotion, powder, jewelry, piercings, perfume, makeup, nail polish, hair accessories, or hair spray on the day of surgery.  Wear loose comfortable clothing.    Leave your valuables at home but bring a payment source for any after-surgery prescriptions you plan to fill at [facility].  Bring a storage case for any glasses/contacts.    An adult who is responsible for you MUST drive you home and should be with you for the first 24 hours after surgery.     The Day of Surgery:  Arrive at [facility] Surgery Entrance at the time directed by your surgeon and check in at the desk.     If you have a living will or healthcare power of , please bring a copy.    You will be taken to the pre-op holding area where you will be

## 2024-12-12 NOTE — PRE-PROCEDURE INSTRUCTIONS
Have you received your Prep? Any questions with prep instructions? Y  Only Clear Liquid Diet day before.Y  Nothing to eat after midnight day before procedure.Y  Are you taking any blood thinners? If so, you need to Stop.N  Remove any jewelry and body piercings.Y  Do you wear glasses? If so, please bring a case to store them in.  Are you having any Covid symptoms?N  Do you have any new rashes, infections, etc. that we should be aware of?N  Do you have a ride home the day of surgery? It cannot be a cab or medical transportation.Y  Verify surgery time/date and what time to arrive at hospital.1000

## 2024-12-13 ENCOUNTER — ANESTHESIA EVENT (OUTPATIENT)
Dept: ENDOSCOPY | Age: 54
End: 2024-12-13
Payer: OTHER GOVERNMENT

## 2024-12-16 ENCOUNTER — HOSPITAL ENCOUNTER (OUTPATIENT)
Age: 54
Setting detail: OUTPATIENT SURGERY
Discharge: HOME | End: 2024-12-16
Attending: STUDENT IN AN ORGANIZED HEALTH CARE EDUCATION/TRAINING PROGRAM | Admitting: STUDENT IN AN ORGANIZED HEALTH CARE EDUCATION/TRAINING PROGRAM
Payer: OTHER GOVERNMENT

## 2024-12-16 ENCOUNTER — ANESTHESIA (OUTPATIENT)
Dept: ENDOSCOPY | Age: 54
End: 2024-12-16
Payer: OTHER GOVERNMENT

## 2024-12-16 VITALS
BODY MASS INDEX: 27.2 KG/M2 | RESPIRATION RATE: 16 BRPM | SYSTOLIC BLOOD PRESSURE: 121 MMHG | DIASTOLIC BLOOD PRESSURE: 85 MMHG | OXYGEN SATURATION: 99 % | HEART RATE: 70 BPM | HEIGHT: 70 IN | TEMPERATURE: 98.5 F | WEIGHT: 190 LBS

## 2024-12-16 DIAGNOSIS — Z12.11 COLON CANCER SCREENING: ICD-10-CM

## 2024-12-16 PROCEDURE — 45380 COLONOSCOPY AND BIOPSY: CPT | Performed by: STUDENT IN AN ORGANIZED HEALTH CARE EDUCATION/TRAINING PROGRAM

## 2024-12-16 PROCEDURE — 7100000011 HC PHASE II RECOVERY - ADDTL 15 MIN: Performed by: STUDENT IN AN ORGANIZED HEALTH CARE EDUCATION/TRAINING PROGRAM

## 2024-12-16 PROCEDURE — 7100000010 HC PHASE II RECOVERY - FIRST 15 MIN: Performed by: STUDENT IN AN ORGANIZED HEALTH CARE EDUCATION/TRAINING PROGRAM

## 2024-12-16 PROCEDURE — 2580000003 HC RX 258: Performed by: ANESTHESIOLOGY

## 2024-12-16 PROCEDURE — 88305 TISSUE EXAM BY PATHOLOGIST: CPT

## 2024-12-16 PROCEDURE — 3609010600 HC COLONOSCOPY POLYPECTOMY SNARE/COLD BIOPSY: Performed by: STUDENT IN AN ORGANIZED HEALTH CARE EDUCATION/TRAINING PROGRAM

## 2024-12-16 PROCEDURE — 6360000002 HC RX W HCPCS: Performed by: NURSE ANESTHETIST, CERTIFIED REGISTERED

## 2024-12-16 PROCEDURE — 3700000001 HC ADD 15 MINUTES (ANESTHESIA): Performed by: STUDENT IN AN ORGANIZED HEALTH CARE EDUCATION/TRAINING PROGRAM

## 2024-12-16 PROCEDURE — 3700000000 HC ANESTHESIA ATTENDED CARE: Performed by: STUDENT IN AN ORGANIZED HEALTH CARE EDUCATION/TRAINING PROGRAM

## 2024-12-16 PROCEDURE — 45385 COLONOSCOPY W/LESION REMOVAL: CPT | Performed by: STUDENT IN AN ORGANIZED HEALTH CARE EDUCATION/TRAINING PROGRAM

## 2024-12-16 PROCEDURE — 2709999900 HC NON-CHARGEABLE SUPPLY: Performed by: STUDENT IN AN ORGANIZED HEALTH CARE EDUCATION/TRAINING PROGRAM

## 2024-12-16 RX ORDER — SODIUM CHLORIDE 0.9 % (FLUSH) 0.9 %
5-40 SYRINGE (ML) INJECTION EVERY 12 HOURS SCHEDULED
Status: DISCONTINUED | OUTPATIENT
Start: 2024-12-16 | End: 2024-12-16 | Stop reason: HOSPADM

## 2024-12-16 RX ORDER — LIDOCAINE HYDROCHLORIDE 20 MG/ML
INJECTION, SOLUTION EPIDURAL; INFILTRATION; INTRACAUDAL; PERINEURAL
Status: DISCONTINUED | OUTPATIENT
Start: 2024-12-16 | End: 2024-12-16 | Stop reason: SDUPTHER

## 2024-12-16 RX ORDER — LIDOCAINE HYDROCHLORIDE 10 MG/ML
1 INJECTION, SOLUTION EPIDURAL; INFILTRATION; INTRACAUDAL; PERINEURAL
Status: DISCONTINUED | OUTPATIENT
Start: 2024-12-16 | End: 2024-12-16 | Stop reason: HOSPADM

## 2024-12-16 RX ORDER — PROPOFOL 10 MG/ML
INJECTION, EMULSION INTRAVENOUS
Status: DISCONTINUED | OUTPATIENT
Start: 2024-12-16 | End: 2024-12-16 | Stop reason: SDUPTHER

## 2024-12-16 RX ORDER — SODIUM CHLORIDE 9 MG/ML
INJECTION, SOLUTION INTRAVENOUS CONTINUOUS
Status: DISCONTINUED | OUTPATIENT
Start: 2024-12-16 | End: 2024-12-16 | Stop reason: HOSPADM

## 2024-12-16 RX ORDER — SODIUM CHLORIDE 0.9 % (FLUSH) 0.9 %
5-40 SYRINGE (ML) INJECTION PRN
Status: DISCONTINUED | OUTPATIENT
Start: 2024-12-16 | End: 2024-12-16 | Stop reason: HOSPADM

## 2024-12-16 RX ORDER — SODIUM CHLORIDE 9 MG/ML
INJECTION, SOLUTION INTRAVENOUS PRN
Status: DISCONTINUED | OUTPATIENT
Start: 2024-12-16 | End: 2024-12-16 | Stop reason: HOSPADM

## 2024-12-16 RX ADMIN — SODIUM CHLORIDE: 9 INJECTION, SOLUTION INTRAVENOUS at 11:28

## 2024-12-16 RX ADMIN — SODIUM CHLORIDE: 9 INJECTION, SOLUTION INTRAVENOUS at 12:45

## 2024-12-16 RX ADMIN — PROPOFOL 80 MG: 10 INJECTION, EMULSION INTRAVENOUS at 12:54

## 2024-12-16 RX ADMIN — PROPOFOL 140 MCG/KG/MIN: 10 INJECTION, EMULSION INTRAVENOUS at 12:54

## 2024-12-16 RX ADMIN — LIDOCAINE HYDROCHLORIDE 80 MG: 20 INJECTION, SOLUTION EPIDURAL; INFILTRATION; INTRACAUDAL; PERINEURAL at 12:54

## 2024-12-16 ASSESSMENT — ENCOUNTER SYMPTOMS
GASTROINTESTINAL NEGATIVE: 1
BACK PAIN: 1

## 2024-12-16 ASSESSMENT — PAIN - FUNCTIONAL ASSESSMENT: PAIN_FUNCTIONAL_ASSESSMENT: 0-10

## 2024-12-16 NOTE — ANESTHESIA PRE PROCEDURE
Department of Anesthesiology  Preprocedure Note       Name:  Stiven Villarreal   Age:  54 y.o.  :  1970                                          MRN:  [id number]         Date:  2024      Surgeon: Surgeon(s):  Ish Wilson MD    Procedure: Procedure(s):  COLORECTAL CANCER SCREENING, NOT HIGH RISK    Medications prior to admission:   Prior to Admission medications    Medication Sig Start Date End Date Taking? Authorizing Provider   sildenafil (VIAGRA) 100 MG tablet TAKE 1 TABLET AS NEEDED FOR ERECTILE DYSFUNCTION 24   Camilo Monroy, APRN - CNP   ibuprofen (ADVIL;MOTRIN) 400 MG tablet Take 1 tablet by mouth every 6 hours as needed for Pain    Provider, MD Sushil       Current medications:    Current Facility-Administered Medications   Medication Dose Route Frequency Provider Last Rate Last Admin   • lidocaine PF 1 % injection 1 mL  1 mL IntraDERmal Once PRN Kayla Crooks MD       • 0.9 % sodium chloride infusion   IntraVENous Continuous Kayla Crooks  mL/hr at 24 1128 New Bag at 24 1128   • sodium chloride flush 0.9 % injection 5-40 mL  5-40 mL IntraVENous 2 times per day Kayla Crooks MD       • sodium chloride flush 0.9 % injection 5-40 mL  5-40 mL IntraVENous PRN Kayla Crooks MD       • 0.9 % sodium chloride infusion   IntraVENous PRN Kayla Crooks MD           Allergies:  No Known Allergies    Problem List:    Patient Active Problem List   Diagnosis Code   • Erectile dysfunction N52.9   • Lumbar radiculopathy M54.16   • Lumbosacral neuritis M54.17   • Herniated lumbar intervertebral disc M51.26       Past Medical History:        Diagnosis Date   • Erectile dysfunction 2018   • History of lumbar discectomy 10/31/2024   • Lumbar radiculopathy 2022       Past Surgical History:        Procedure Laterality Date   • LIPOMA RESECTION     • PAIN MANAGEMENT PROCEDURE     • WISDOM TOOTH EXTRACTION Bilateral        Social History:    Social History     Tobacco Use

## 2024-12-16 NOTE — ANESTHESIA POSTPROCEDURE EVALUATION
Department of Anesthesiology  Postprocedure Note    Patient: Stiven Villarreal  MRN: [id number]  YOB: 1970  Date of evaluation: 12/16/2024    Procedure Summary       Date: 12/16/24 Room / Location: [address] [address] / [facility]    Anesthesia Start: 1251 Anesthesia Stop: 1323    Procedure: COLONOSCOPY POLYPECTOMY SNARE/BIOPSY (Lower GI Region) Diagnosis:       Colon cancer screening      (Colon cancer screening [Z12.11])    Surgeons: Ish Wilson MD Responsible Provider: Wilber Esocbar MD    Anesthesia Type: general, TIVA ASA Status: 2            Anesthesia Type: No value filed.    Ritesh Phase I:      Ritesh Phase II: Ritesh Score: 10    Anesthesia Post Evaluation    Patient location during evaluation: PACU  Patient participation: complete - patient participated  Level of consciousness: awake and alert  Airway patency: patent  Nausea & Vomiting: no vomiting  Cardiovascular status: hemodynamically stable  Respiratory status: acceptable  Hydration status: euvolemic  Comments: POST- ANESTHESIA EVALUATION       Pt Name: Stiven Villarreal  MRN: [id number]  YOB: 1970  Date of evaluation: 12/16/2024  Time:  3:12 PM      /85   Pulse 70   Temp 98.5 °F (36.9 °C)   Resp 16   Ht 1.778 m (5' 10\")   Wt 86.2 kg (190 lb)   SpO2 99%   BMI 27.26 kg/m²      Consciousness Level  Awake  Cardiopulmonary Status  Stable  Pain Adequately Treated YES  Nausea / Vomiting  NO  Adequate Hydration  YES  Anesthesia Related Complications NONE      Electronically signed by Wilber Escobar MD on 12/16/2024 at 3:12 PM         Pain management: satisfactory to patient    No notable events documented.

## 2024-12-16 NOTE — H&P
Vaping status: Never Used   Substance and Sexual Activity    Alcohol use: Yes     Comment: social    Drug use: No    Sexual activity: Yes     Partners: Female     Social Determinants of Health     Financial Resource Strain: Low Risk  (10/2/2024)    Overall Financial Resource Strain (CARDIA)     Difficulty of Paying Living Expenses: Not hard at all   Food Insecurity: No Food Insecurity (10/17/2024)    Received from [facility]    Hunger Screening     Within the past 12 months we worried whether our food would run out before we got money to buy more.: Never True     Within the past 12 months the food we bought just didn't last and we didn't have money to get more.: Never True   Transportation Needs: Unknown (10/2/2024)    PRAPARE - Transportation     Lack of Transportation (Non-Medical): No   Physical Activity: Sufficiently Active (5/26/2022)    Exercise Vital Sign     Days of Exercise per Week: 5 days     Minutes of Exercise per Session: 30 min   Intimate Partner Violence: Not At Risk (5/26/2022)    Humiliation, Afraid, Rape, and Kick questionnaire     Fear of Current or Ex-Partner: No     Emotionally Abused: No     Physically Abused: No     Sexually Abused: No   Housing Stability: Unknown (10/2/2024)    Housing Stability Vital Sign     Homeless in the Last Year: No           REVIEW OF SYSTEMS      No Known Allergies    No current facility-administered medications on file prior to encounter.     Current Outpatient Medications on File Prior to Encounter   Medication Sig Dispense Refill    bisacodyl 5 MG EC tablet Take as directed by physician office for colonoscopy prep 4 tablet 0    sildenafil (VIAGRA) 100 MG tablet TAKE 1 TABLET AS NEEDED FOR ERECTILE DYSFUNCTION 30 tablet 3    ibuprofen (ADVIL;MOTRIN) 400 MG tablet Take 1 tablet by mouth every 6 hours as needed for Pain         Review of Systems   Constitutional: Negative.    HENT: Negative.     Eyes:  Positive for visual disturbance (wearing eye

## 2024-12-16 NOTE — OP NOTE
COLONOSCOPY    DATE OF PROCEDURE: 12/16/2024    SURGEON: Ish Wilson MD  Facility : [facility]  ASSISTANT: None  Anesthesia: Monitored anesthesia care  PREOPERATIVE DIAGNOSIS: Colonoscopy screening    POSTOPERATIVE DIAGNOSIS: as described below    OPERATION: Total colonoscopy with biopsy and snare polypectomy     ANESTHESIA: Monitored Anesthesia Care (MAC)    ESTIMATED BLOOD LOSS: less than 50 cc    COMPLICATIONS: None.     SPECIMENS:  Was Obtained:     ID Type Source Tests Collected by Time Destination   A : TRANSVERSE COLON POLYP Tissue Colon-Transverse SURGICAL PATHOLOGY Ish Wilson MD 12/16/2024 1307    B : SIGMOID COLON BIOPSY Tissue Sigmoid Colon SURGICAL PATHOLOGY Ish Wilson MD 12/16/2024 1313         HISTORY: The patient is a 54 y.o. year old male with history of above preop diagnosis.  I recommended colonoscopy with possible biopsy or polypectomy and I explained the risk, benefits, expected outcome, and alternatives to the procedure.  Risks included but are not limited to medication allergy, medication reaction, cardiovascular and respiratory problems, bleeding, perforation, infection, and/or missed diagnosis.  The patient understands and is in agreement.        PROCEDURE: Following arrival in the endoscopy room, the patient was placed in the left lateral decubitus position and final time-out accomplished in the presence of the nursing staff. Baseline vital signs were obtained and reviewed. The patient was given IV Monitored Anesthesia Care (MAC) and vitals monitoring per anesthesia department.     Digital rectal exam- normal    The colonoscope was inserted per rectum and advanced under direct vision to the cecum without difficulty.  Photodocumentation of the maximal extent reached (cecum, appendiceal orifice, ileocecal valve, and terminal ileum if indicated) and other findings was obtained.     Post sedation note :The patient's SPO2 remained above 90% throughout the

## 2024-12-16 NOTE — DISCHARGE INSTRUCTIONS
Colonoscopy: What to Expect at Home  Your Recovery  Your doctor will talk to you about when you will need your next colonoscopy. The results of your test and your risk for colorectal cancer will help your doctor decide how often you need to be checked.  After the test, you may be bloated or have gas pains. You may need to pass gas. If a biopsy was done or a polyp was removed, you may have streaks of blood in your stool (feces) for a few days.    How can you care for yourself at home?  Activity  Rest as much as you need to after you go home.  You should be able to go back to your usual activities the day after the test.  Diet  Follow your doctor’s directions for eating.  Drink plenty of fluids (unless your doctor has told you not to) to replace the fluids that were lost during the colon prep.  Medicines  If polyps were removed or a biopsy was done during the test, your doctor may tell you not to take aspirin or other anti-inflammatory medicines, such as ibuprofen (Advil, Motrin) and naproxen (Aleve), for a few days.  Follow-up care is a key part of your treatment and safety. Be sure to make and go to all appointments, and call your doctor if you are having problems.  When should you call for help?  Call 911 anytime you think you may need emergency care. For example, call if:  You passed out (lost consciousness).  You pass maroon or bloody stools.  You have severe belly pain.  Call your doctor now or seek immediate medical care if:  Your stools are black and tarlike.  Your stools have streaks of blood, but you did not have a biopsy or any polyps removed.  You have belly pain, or your belly is swollen and firm.  You vomit.  You have a fever.  You are very dizzy.  Watch closely for changes in your health, and be sure to contact your doctor if you have any problems.   Where can you learn more?   Go to [email or web address] and sign in to your [employer] account. Enter E264 in the Search [employer]

## 2024-12-18 LAB — SURGICAL PATHOLOGY REPORT: NORMAL

## 2025-02-05 DIAGNOSIS — N52.9 ERECTILE DYSFUNCTION, UNSPECIFIED ERECTILE DYSFUNCTION TYPE: ICD-10-CM

## 2025-02-05 RX ORDER — SILDENAFIL 100 MG/1
TABLET, FILM COATED ORAL
Qty: 30 TABLET | Refills: 2 | Status: SHIPPED | OUTPATIENT
Start: 2025-02-05

## (undated) DEVICE — SINGLE-USE POLYPECTOMY SNARE: Brand: CAPTIFLEX

## (undated) DEVICE — DEFENDO AIR WATER SUCTION AND BIOPSY VALVE KIT FOR  OLYMPUS: Brand: DEFENDO AIR/WATER/SUCTION AND BIOPSY VALVE

## (undated) DEVICE — GLOVE ORANGE PI 8   MSG9080

## (undated) DEVICE — POLYP TRAP: Brand: TRAPEASE®

## (undated) DEVICE — ENDO KIT W/SYRINGE: Brand: MEDLINE INDUSTRIES, INC.

## (undated) DEVICE — FORCEPS BX L240CM WRK CHN 2.8MM STD CAP W/ NDL MIC MESH